# Patient Record
Sex: MALE | Race: WHITE | NOT HISPANIC OR LATINO | Employment: FULL TIME | URBAN - METROPOLITAN AREA
[De-identification: names, ages, dates, MRNs, and addresses within clinical notes are randomized per-mention and may not be internally consistent; named-entity substitution may affect disease eponyms.]

---

## 2023-10-12 ENCOUNTER — TELEPHONE (OUTPATIENT)
Dept: GASTROENTEROLOGY | Facility: CLINIC | Age: 53
End: 2023-10-12
Payer: COMMERCIAL

## 2023-10-12 NOTE — TELEPHONE ENCOUNTER
Mr. Dueñas called to schedule his colonoscopy. He would like to have an egd as well. He saw his pcp for chest pain in March and says it continues. Please advise.

## 2023-10-15 PROBLEM — R00.2 PALPITATIONS: Status: ACTIVE | Noted: 2023-10-15

## 2023-10-15 PROBLEM — E55.9 VITAMIN D DEFICIENCY: Status: ACTIVE | Noted: 2023-10-15

## 2023-10-15 PROBLEM — M89.8X9 ABNORMAL BONE FORMATION: Status: ACTIVE | Noted: 2023-10-15

## 2023-10-15 PROBLEM — M89.8X1 PAIN OF RIGHT SCAPULA: Status: ACTIVE | Noted: 2023-10-15

## 2023-10-15 PROBLEM — H57.89 EYE IRRITATION: Status: ACTIVE | Noted: 2023-10-15

## 2023-10-15 RX ORDER — FINASTERIDE 1 MG/1
1 TABLET, FILM COATED ORAL DAILY
COMMUNITY
End: 2023-10-17 | Stop reason: ALTCHOICE

## 2023-10-15 RX ORDER — ESOMEPRAZOLE MAGNESIUM 40 MG/1
20 CAPSULE, DELAYED RELEASE ORAL DAILY
COMMUNITY
End: 2023-12-13 | Stop reason: ALTCHOICE

## 2023-10-15 RX ORDER — FLUTICASONE PROPIONATE 50 MCG
1 SPRAY, SUSPENSION (ML) NASAL 2 TIMES DAILY PRN
COMMUNITY
End: 2023-12-20 | Stop reason: ALTCHOICE

## 2023-10-15 RX ORDER — HYDROCORTISONE 25 MG/G
1 CREAM TOPICAL AS NEEDED
COMMUNITY
Start: 2023-04-02

## 2023-10-16 ASSESSMENT — PROMIS GLOBAL HEALTH SCALE
RATE_PHYSICAL_HEALTH: VERY GOOD
RATE_GENERAL_HEALTH: VERY GOOD
RATE_SOCIAL_SATISFACTION: VERY GOOD
CARRYOUT_SOCIAL_ACTIVITIES: VERY GOOD
RATE_AVERAGE_PAIN: 5
EMOTIONAL_PROBLEMS: RARELY
RATE_MENTAL_HEALTH: VERY GOOD
CARRYOUT_PHYSICAL_ACTIVITIES: COMPLETELY
RATE_QUALITY_OF_LIFE: VERY GOOD

## 2023-10-17 ENCOUNTER — OFFICE VISIT (OUTPATIENT)
Dept: PRIMARY CARE | Facility: CLINIC | Age: 53
End: 2023-10-17
Payer: COMMERCIAL

## 2023-10-17 ENCOUNTER — LAB (OUTPATIENT)
Dept: LAB | Facility: LAB | Age: 53
End: 2023-10-17
Payer: COMMERCIAL

## 2023-10-17 VITALS
HEIGHT: 68 IN | WEIGHT: 154.76 LBS | RESPIRATION RATE: 16 BRPM | SYSTOLIC BLOOD PRESSURE: 122 MMHG | BODY MASS INDEX: 23.46 KG/M2 | DIASTOLIC BLOOD PRESSURE: 70 MMHG | OXYGEN SATURATION: 98 %

## 2023-10-17 DIAGNOSIS — R07.9 CHEST PAIN, UNSPECIFIED TYPE: ICD-10-CM

## 2023-10-17 DIAGNOSIS — L64.9 MALE PATTERN BALDNESS: ICD-10-CM

## 2023-10-17 DIAGNOSIS — Z00.00 ENCOUNTER FOR PREVENTIVE HEALTH EXAMINATION: ICD-10-CM

## 2023-10-17 DIAGNOSIS — Z00.00 ENCOUNTER FOR PREVENTIVE HEALTH EXAMINATION: Primary | ICD-10-CM

## 2023-10-17 LAB
ALBUMIN SERPL BCP-MCNC: 4.5 G/DL (ref 3.4–5)
ALP SERPL-CCNC: 65 U/L (ref 33–120)
ALT SERPL W P-5'-P-CCNC: 25 U/L (ref 10–52)
ANION GAP SERPL CALC-SCNC: 14 MMOL/L (ref 10–20)
AST SERPL W P-5'-P-CCNC: 22 U/L (ref 9–39)
BILIRUB SERPL-MCNC: 0.9 MG/DL (ref 0–1.2)
BUN SERPL-MCNC: 15 MG/DL (ref 6–23)
CALCIUM SERPL-MCNC: 9.6 MG/DL (ref 8.6–10.6)
CHLORIDE SERPL-SCNC: 102 MMOL/L (ref 98–107)
CHOLEST SERPL-MCNC: 164 MG/DL (ref 0–199)
CHOLESTEROL/HDL RATIO: 2.8
CO2 SERPL-SCNC: 29 MMOL/L (ref 21–32)
CREAT SERPL-MCNC: 0.81 MG/DL (ref 0.5–1.3)
ERYTHROCYTE [DISTWIDTH] IN BLOOD BY AUTOMATED COUNT: 12.2 % (ref 11.5–14.5)
EST. AVERAGE GLUCOSE BLD GHB EST-MCNC: 108 MG/DL
GFR SERPL CREATININE-BSD FRML MDRD: >90 ML/MIN/1.73M*2
GLUCOSE SERPL-MCNC: 85 MG/DL (ref 74–99)
HBA1C MFR BLD: 5.4 %
HCT VFR BLD AUTO: 47.5 % (ref 41–52)
HDLC SERPL-MCNC: 59.4 MG/DL
HGB BLD-MCNC: 15.3 G/DL (ref 13.5–17.5)
LDLC SERPL CALC-MCNC: 91 MG/DL
MCH RBC QN AUTO: 27.5 PG (ref 26–34)
MCHC RBC AUTO-ENTMCNC: 32.2 G/DL (ref 32–36)
MCV RBC AUTO: 85 FL (ref 80–100)
NON HDL CHOLESTEROL: 105 MG/DL (ref 0–149)
NRBC BLD-RTO: 0 /100 WBCS (ref 0–0)
PLATELET # BLD AUTO: 186 X10*3/UL (ref 150–450)
PMV BLD AUTO: 10.4 FL (ref 7.5–11.5)
POTASSIUM SERPL-SCNC: 4.6 MMOL/L (ref 3.5–5.3)
PROT SERPL-MCNC: 6.7 G/DL (ref 6.4–8.2)
PSA SERPL-MCNC: 0.48 NG/ML
RBC # BLD AUTO: 5.56 X10*6/UL (ref 4.5–5.9)
SODIUM SERPL-SCNC: 140 MMOL/L (ref 136–145)
TRIGL SERPL-MCNC: 67 MG/DL (ref 0–149)
VLDL: 13 MG/DL (ref 0–40)
WBC # BLD AUTO: 5.2 X10*3/UL (ref 4.4–11.3)

## 2023-10-17 PROCEDURE — 85027 COMPLETE CBC AUTOMATED: CPT

## 2023-10-17 PROCEDURE — 80061 LIPID PANEL: CPT

## 2023-10-17 PROCEDURE — 84153 ASSAY OF PSA TOTAL: CPT

## 2023-10-17 PROCEDURE — 36415 COLL VENOUS BLD VENIPUNCTURE: CPT

## 2023-10-17 PROCEDURE — 83036 HEMOGLOBIN GLYCOSYLATED A1C: CPT

## 2023-10-17 PROCEDURE — 99213 OFFICE O/P EST LOW 20 MIN: CPT | Performed by: INTERNAL MEDICINE

## 2023-10-17 PROCEDURE — 80053 COMPREHEN METABOLIC PANEL: CPT

## 2023-10-17 PROCEDURE — 99396 PREV VISIT EST AGE 40-64: CPT | Performed by: INTERNAL MEDICINE

## 2023-10-17 RX ORDER — FINASTERIDE 5 MG/1
5 TABLET, FILM COATED ORAL DAILY
Qty: 30 TABLET | Refills: 11 | Status: SHIPPED | OUTPATIENT
Start: 2023-10-17 | End: 2023-11-10 | Stop reason: SDUPTHER

## 2023-10-17 ASSESSMENT — ENCOUNTER SYMPTOMS
DEPRESSION: 0
LOSS OF SENSATION IN FEET: 0
OCCASIONAL FEELINGS OF UNSTEADINESS: 0

## 2023-10-17 NOTE — PROGRESS NOTES
"Subjective   Patient ID: Jeffery Dueñas is a 52 y.o. male who presents for Annual Exam.    HPI   Jeffery is a pleasant 52-year-old male with history of acid reflux here for physical.  Overall doing well.  Continues to have chest pain, it is burning, midsternal, comes and goes, he thinks is his acid reflux.  It is not associated with activity.  Denies any nausea vomiting, has occasional abdominal discomfort.  Appetite intact.  At times he feels like he may get short of breath with this chest pains but he is not really sure.  Does not happen every time.  He exercises regularly without any chest pain shortness of breath.  No palpitations no dizziness lightheadedness.  No diaphoresis.  No cough.  No wheezing.  Otherwise he has no complaints.  Continues to work.  Exercises.  Tries eat a healthy diet.    Review of Systems:  No unintentional weight gain or weight loss, no fevers no chills, no night sweats.  No fatigue.    No congestion runny nose sore throat.  No ear pain.  No tinnitus.  No change in hearing.  No change in vision.    No neck pain.    No cough no shortness of breath.  No wheezing.    Besides the episodes explained in HPI, no chest pain shortness of breath palpitations with rest or with activity.  No orthopnea no PND.  No edema.    No heat or cold intolerance, constipation diarrhea, weight changes.  No polyuria polydipsia.      No joint pain besides occasional aches and pains,  No muscle weakness.    No new rash.    No headache, no change in memory, no change in cognition.  No weakness numbness tingling of extremities.  No difficulty with gait.    No easy bruisability.    No feeling of sadness, loss of interest, anxiety.    No difficulty urinating, no dribbling, no hesitancy.  No testicular pain.  Objective   /70 (BP Location: Left arm)   Resp 16   Ht 1.721 m (5' 7.75\")   Wt 70.2 kg (154 lb 12.2 oz)   SpO2 98%   BMI 23.71 kg/m²     Physical Exam  Calm coherent well-appearing.    Neck is supple with " no tenderness, thyroid mobile soft with no nodules, oropharynx clear.  Sinuses nontender.    Breathing comfortably, clear to auscultation bilaterally.    Regular rate and rhythm, no murmur gallops or rubs, good distal pulses.  No edema.  No JVD.  No carotid bruit.    Abdomen is soft, nontender, normal bowel sounds.  No ascites.  No hepatosplenomegaly.    Upper and lower extremity joints intact with full active range of motion.  Strength is 5 out of 5 in upper and lower extremities.    Skin is grossly normal, moist.     Extremity warm, well-perfused, no clubbing or cyanosis.    Coherent, cognition intact, memory intact.  Gait intact.    No cervical, supraclavicular, axillary or inguinal lymphadenopathy.              Assessment/Plan     Jeffery is a pleasant 52-year-old male here for physical.    Chest pains: These are most likely due to his acid reflux, he is going to call Dr. aMnn and make an appointment for evaluation of endoscopy, he is already on PPI and taking it correctly.  However I do think we need to get work-up for ruling out cardiac origin given occasional shortness of breath although he really has no symptoms with exercising.  Certainly no concern for unstable angina.  We will start with CT calcium score and echo stress test.  Call with any new symptoms.    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    Health maintenance: He is due for colonoscopy, he is going to call Dr. Mann make this appointment.  Check PSA.  Clinically asymptomatic.  Routine labs today.  Has a healthy lifestyle.    Follow-up in 1 year.

## 2023-10-20 ENCOUNTER — TELEPHONE (OUTPATIENT)
Dept: PRIMARY CARE | Facility: CLINIC | Age: 53
End: 2023-10-20
Payer: COMMERCIAL

## 2023-11-09 ENCOUNTER — HOSPITAL ENCOUNTER (OUTPATIENT)
Dept: CARDIOLOGY | Facility: HOSPITAL | Age: 53
Discharge: HOME | End: 2023-11-09
Payer: COMMERCIAL

## 2023-11-09 DIAGNOSIS — R07.9 CHEST PAIN, UNSPECIFIED TYPE: ICD-10-CM

## 2023-11-09 PROCEDURE — 93016 CV STRESS TEST SUPVJ ONLY: CPT | Performed by: INTERNAL MEDICINE

## 2023-11-09 PROCEDURE — 93018 CV STRESS TEST I&R ONLY: CPT | Performed by: INTERNAL MEDICINE

## 2023-11-09 PROCEDURE — 93325 DOPPLER ECHO COLOR FLOW MAPG: CPT

## 2023-11-09 PROCEDURE — 93350 STRESS TTE ONLY: CPT | Performed by: INTERNAL MEDICINE

## 2023-11-09 PROCEDURE — 93350 STRESS TTE ONLY: CPT

## 2023-11-09 PROCEDURE — 93325 DOPPLER ECHO COLOR FLOW MAPG: CPT | Performed by: INTERNAL MEDICINE

## 2023-11-09 PROCEDURE — 2500000004 HC RX 250 GENERAL PHARMACY W/ HCPCS (ALT 636 FOR OP/ED): Performed by: INTERNAL MEDICINE

## 2023-11-09 RX ADMIN — PERFLUTREN 5 ML OF DILUTION: 6.52 INJECTION, SUSPENSION INTRAVENOUS at 13:09

## 2023-11-10 DIAGNOSIS — L64.9 MALE PATTERN BALDNESS: ICD-10-CM

## 2023-11-10 RX ORDER — FINASTERIDE 5 MG/1
5 TABLET, FILM COATED ORAL DAILY
Qty: 90 TABLET | Refills: 2 | Status: SHIPPED | OUTPATIENT
Start: 2023-11-10 | End: 2023-12-13 | Stop reason: ALTCHOICE

## 2023-11-13 DIAGNOSIS — Z12.11 COLON CANCER SCREENING: ICD-10-CM

## 2023-11-13 RX ORDER — POLYETHYLENE GLYCOL 3350, SODIUM SULFATE ANHYDROUS, SODIUM BICARBONATE, SODIUM CHLORIDE, POTASSIUM CHLORIDE 236; 22.74; 6.74; 5.86; 2.97 G/4L; G/4L; G/4L; G/4L; G/4L
4000 POWDER, FOR SOLUTION ORAL ONCE
Qty: 4000 ML | Refills: 0 | Status: SHIPPED | OUTPATIENT
Start: 2023-11-13 | End: 2023-11-13

## 2023-11-14 DIAGNOSIS — Z86.010 PERSONAL HISTORY OF COLONIC POLYPS: Primary | ICD-10-CM

## 2023-11-14 RX ORDER — SOD SULF/POT CHLORIDE/MAG SULF 1.479 G
TABLET ORAL
Qty: 24 TABLET | Refills: 0 | Status: SHIPPED | OUTPATIENT
Start: 2023-11-14 | End: 2023-12-13 | Stop reason: ALTCHOICE

## 2023-11-22 ENCOUNTER — OFFICE VISIT (OUTPATIENT)
Dept: GASTROENTEROLOGY | Facility: EXTERNAL LOCATION | Age: 53
End: 2023-11-22
Payer: COMMERCIAL

## 2023-11-22 DIAGNOSIS — Z12.11 SPECIAL SCREENING FOR MALIGNANT NEOPLASMS, COLON: ICD-10-CM

## 2023-11-22 DIAGNOSIS — D12.2 BENIGN NEOPLASM OF ASCENDING COLON: ICD-10-CM

## 2023-11-22 DIAGNOSIS — K21.9 GASTRO-ESOPHAGEAL REFLUX DISEASE WITHOUT ESOPHAGITIS: ICD-10-CM

## 2023-11-22 DIAGNOSIS — K22.70 BARRETT'S ESOPHAGUS WITHOUT DYSPLASIA: Primary | ICD-10-CM

## 2023-11-22 DIAGNOSIS — Z86.010 PERSONAL HISTORY OF COLONIC POLYPS: ICD-10-CM

## 2023-11-22 DIAGNOSIS — D12.0 BENIGN NEOPLASM OF CECUM: ICD-10-CM

## 2023-11-22 PROCEDURE — 88305 TISSUE EXAM BY PATHOLOGIST: CPT | Performed by: PATHOLOGY

## 2023-11-22 PROCEDURE — 88305 TISSUE EXAM BY PATHOLOGIST: CPT

## 2023-11-22 PROCEDURE — 43239 EGD BIOPSY SINGLE/MULTIPLE: CPT | Performed by: INTERNAL MEDICINE

## 2023-11-22 PROCEDURE — 45385 COLONOSCOPY W/LESION REMOVAL: CPT | Performed by: INTERNAL MEDICINE

## 2023-11-24 ENCOUNTER — LAB REQUISITION (OUTPATIENT)
Dept: LAB | Facility: HOSPITAL | Age: 53
End: 2023-11-24
Payer: COMMERCIAL

## 2023-11-25 ENCOUNTER — ANCILLARY PROCEDURE (OUTPATIENT)
Dept: RADIOLOGY | Facility: CLINIC | Age: 53
End: 2023-11-25
Payer: COMMERCIAL

## 2023-11-25 DIAGNOSIS — R07.9 CHEST PAIN, UNSPECIFIED TYPE: ICD-10-CM

## 2023-11-25 PROCEDURE — 75571 CT HRT W/O DYE W/CA TEST: CPT

## 2023-11-27 DIAGNOSIS — R91.1 LUNG NODULE: Primary | ICD-10-CM

## 2023-11-27 DIAGNOSIS — Z01.818 PRE-OP TESTING: ICD-10-CM

## 2023-11-27 NOTE — PROGRESS NOTES
Bronchoscopy Scheduling Request    Pre-bronchoscopy visit: New patient visit with Bronchoscopy group provider    Cytology on-site:  Yes  Location:  [facility]  Performing physician:  Advanced diagnostic bronchoscopist  Referring physician:  Ventura Leigh MD  Indication:  RUL nodule  Sedation / Anesthesia:  GA  Procedure:  Navigational bronchoscopy, Staging EBUS  Time:  Tier 3  Fluorscopy:   Yes  Imaging needed:  CT Valentin (ordered by PCP)  Labs:  CBC, BMP  Meds:  None  Special Considerations:  None  Reviewed by:  Mg Kay MD

## 2023-11-29 ENCOUNTER — TELEMEDICINE (OUTPATIENT)
Dept: PULMONOLOGY | Facility: CLINIC | Age: 53
End: 2023-11-29
Payer: COMMERCIAL

## 2023-11-29 ENCOUNTER — LAB (OUTPATIENT)
Dept: LAB | Facility: LAB | Age: 53
End: 2023-11-29
Payer: COMMERCIAL

## 2023-11-29 DIAGNOSIS — R91.1 LUNG NODULE: ICD-10-CM

## 2023-11-29 DIAGNOSIS — Z01.818 PRE-OP TESTING: ICD-10-CM

## 2023-11-29 LAB
ALBUMIN SERPL BCP-MCNC: 4.8 G/DL (ref 3.4–5)
ALP SERPL-CCNC: 64 U/L (ref 33–120)
ALT SERPL W P-5'-P-CCNC: 24 U/L (ref 10–52)
ANION GAP SERPL CALC-SCNC: 12 MMOL/L (ref 10–20)
AST SERPL W P-5'-P-CCNC: 16 U/L (ref 9–39)
BASOPHILS # BLD AUTO: 0.02 X10*3/UL (ref 0–0.1)
BASOPHILS NFR BLD AUTO: 0.3 %
BILIRUB SERPL-MCNC: 2 MG/DL (ref 0–1.2)
BUN SERPL-MCNC: 18 MG/DL (ref 6–23)
CALCIUM SERPL-MCNC: 10.1 MG/DL (ref 8.6–10.6)
CHLORIDE SERPL-SCNC: 102 MMOL/L (ref 98–107)
CO2 SERPL-SCNC: 29 MMOL/L (ref 21–32)
CREAT SERPL-MCNC: 0.89 MG/DL (ref 0.5–1.3)
EOSINOPHIL # BLD AUTO: 0.05 X10*3/UL (ref 0–0.7)
EOSINOPHIL NFR BLD AUTO: 0.7 %
ERYTHROCYTE [DISTWIDTH] IN BLOOD BY AUTOMATED COUNT: 12.1 % (ref 11.5–14.5)
GFR SERPL CREATININE-BSD FRML MDRD: >90 ML/MIN/1.73M*2
GLUCOSE SERPL-MCNC: 142 MG/DL (ref 74–99)
HCT VFR BLD AUTO: 48.7 % (ref 41–52)
HGB BLD-MCNC: 16.2 G/DL (ref 13.5–17.5)
IMM GRANULOCYTES # BLD AUTO: 0.02 X10*3/UL (ref 0–0.7)
IMM GRANULOCYTES NFR BLD AUTO: 0.3 % (ref 0–0.9)
LYMPHOCYTES # BLD AUTO: 2.19 X10*3/UL (ref 1.2–4.8)
LYMPHOCYTES NFR BLD AUTO: 32.3 %
MCH RBC QN AUTO: 27.6 PG (ref 26–34)
MCHC RBC AUTO-ENTMCNC: 33.3 G/DL (ref 32–36)
MCV RBC AUTO: 83 FL (ref 80–100)
MONOCYTES # BLD AUTO: 0.47 X10*3/UL (ref 0.1–1)
MONOCYTES NFR BLD AUTO: 6.9 %
NEUTROPHILS # BLD AUTO: 4.04 X10*3/UL (ref 1.2–7.7)
NEUTROPHILS NFR BLD AUTO: 59.5 %
NRBC BLD-RTO: 0 /100 WBCS (ref 0–0)
PLATELET # BLD AUTO: 234 X10*3/UL (ref 150–450)
POTASSIUM SERPL-SCNC: 4.3 MMOL/L (ref 3.5–5.3)
PROT SERPL-MCNC: 6.9 G/DL (ref 6.4–8.2)
RBC # BLD AUTO: 5.86 X10*6/UL (ref 4.5–5.9)
SODIUM SERPL-SCNC: 139 MMOL/L (ref 136–145)
WBC # BLD AUTO: 6.8 X10*3/UL (ref 4.4–11.3)

## 2023-11-29 PROCEDURE — 36415 COLL VENOUS BLD VENIPUNCTURE: CPT

## 2023-11-29 PROCEDURE — 80053 COMPREHEN METABOLIC PANEL: CPT

## 2023-11-29 PROCEDURE — 99202 OFFICE O/P NEW SF 15 MIN: CPT | Performed by: NURSE PRACTITIONER

## 2023-11-29 PROCEDURE — 85025 COMPLETE CBC W/AUTO DIFF WBC: CPT

## 2023-11-29 ASSESSMENT — ENCOUNTER SYMPTOMS
BACK PAIN: 1
NERVOUS/ANXIOUS: 1

## 2023-11-29 NOTE — PROGRESS NOTES
" Patient: Jeffery Dueñas    [id number]  : 1970 -- AGE 53 y.o.    Provider: EDITH Matias     Location [facility]   Service Date: 2023              [facility]  New Visit Note      HISTORY OF PRESENT ILLNESS     The patient's referring provider is:  PCP Dr Leigh     HISTORY OF PRESENT ILLNESS   Jeffery Dueñas \"Calvin\" is a 53 y.o. male who presents to a [facility] for an evaluation with concerns of Lung Nodule. I have independently interviewed and examined the patient in the office and reviewed available records.    Current History  Patient reported c/o acid reflux  symptoms to PCP and was referred to CT cardio score and stress test. Stress test as wnl. CT score revealed right lung adenopathy.   At baseline, he does not have dyspnea on exertion or none at rest. He reports taking a deep breathe can be difficult.  He currently sits for most of the day, works inside the house, but does not carry loads and do strenuous exercise. He is active in her everyday life and carries loads and does strenuous exercise.  He also denies orthopnea, pnd, or han.  His weight has been mostly stable.  He also denies chronic cough, wheezing, and clear, green, blood streaks, but no sputum. No night cough. No hemoptysis. No fever or shivering chills. He has a runny nose, and a tingling sensation in the back of his throat.  Also complains of heartburn- on PPI. He has midsternal chest pain.       Previous pulmonary history: He has no history of recurrent infections, or lung disease as a child.  He had no previous lung hx, never on oxygen or inhaler therapy.   Had PNA as a child    Inhalers/nebulized medications: none    Hospitalization History: He has not been hospitalized over the last year for breathing related problem.    Sleep history:  Denies snoring, apnea, feeling tired during the day or taking naps during the day.     ALLERGIES " AND MEDICATIONS     ALLERGIES  No Known Allergies    MEDICATIONS  Current Outpatient Medications   Medication Sig Dispense Refill    esomeprazole (NexIUM) 40 mg DR capsule Take 1 capsule (40 mg) by mouth once daily.      finasteride (Proscar) 5 mg tablet Take 1 tablet (5 mg) by mouth once daily. Do not crush, chew, or split. 90 tablet 2    fluticasone (Flonase) 50 mcg/actuation nasal spray Administer 1 spray into each nostril 2 times a day as needed.      hydrocortisone 2.5 % cream Apply 1 Application topically if needed.  APPLY 2-3 TIMES A DAY TO AFFECTED AREA(S)      sod sulf-pot chloride-mag sulf (Sutab) 1.479-0.188- 0.225 gram tablet Starting at 6pm open one bottle of pills and fill glass provided with water and drink according to prep sheet. Start the 2nd bottle with directions on prep sheet 5 hours before procedure time (Patient not taking: Reported on 11/29/2023) 24 tablet 0     No current facility-administered medications for this visit.         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, Other chest pain (08/31/2018), Other conditions influencing health status (06/30/2017), Other synovitis and tenosynovitis, left forearm (05/09/2017), Overexertion from repetitive movements, initial encounter (01/10/2017), Pain in right hip (11/19/2019), Pain in unspecified wrist (01/05/2017), Personal history of other diseases of the digestive system (12/14/2018), Personal history of other diseases of the musculoskeletal system and connective tissue (07/21/2016), Personal history of other diseases of the musculoskeletal system and connective tissue (07/23/2016), Personal history of other diseases of the respiratory system (11/22/2019), Personal history of other diseases of the  respiratory system (03/13/2017), Personal history of other diseases of the respiratory system, Personal history of other diseases of the respiratory system (10/28/2013), Personal history of other specified conditions (12/27/2018), Personal history of other specified conditions, Personal history of other specified conditions (10/12/2018), Personal history of other specified conditions, Unspecified sprain of left wrist, initial encounter (05/09/2017), and Unspecified sprain of unspecified shoulder joint, initial encounter (06/27/2013).    PAST SURGICAL HISTORY  No past surgical history on file.    IMMUNIZATION HISTORY  Immunization History   Administered Date(s) Administered    Influenza, Unspecified 12/02/2009, 10/09/2012, 09/25/2013, 10/23/2019    Influenza, seasonal, injectable 12/16/2021    Pfizer COVID-19 vaccine, bivalent, age 12 years and older (30 mcg/0.3 mL) 11/16/2022    Pfizer Purple Cap SARS-CoV-2 03/06/2021, 03/29/2021, 10/10/2021    Zoster, Unspecified 12/16/2021, 06/07/2022       SOCIAL HISTORY  He  has no history on file for tobacco use, alcohol use, and drug use. 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  DOES/DOES NOT EC: does not have exposure to birds or exotic animals.    FAMILY HISTORY  Family History   Problem Relation Name Age of Onset    Psoriasis Father      Idiopathic pulmonary fibrosis Father      Other (stroke syndrome) Father      Colon cancer Brother       DOES/DOES NOT EC: does have a family history of pulmonary disease.  DOES/DOES NOT EC: does have a family history of cancer.  DOES/DOES NOT EC: does have a family history of autoimmune disorders.    RESULTS/DATA     Pulmonary Function Test Results           Chest Radiograph     No results found for this or any  previous visit from the past 2000 days.      Chest CT Scan        CT chest wo IV contrast for BRIGID bronc planning  Status: Final result     PACS Images     Show images for CT chest wo IV contrast for BRIGID bronc planning  Signed by    Signed Time Phone Pager   Santos Cai MD 11/28/2023 12:21 [phone]     Exam Information    Status Exam Begun Exam Ended   Final 11/28/2023 10:25 11/28/2023 10:37     Study Result    Narrative & Impression   Interpreted By:  Santos Cai,   STUDY:  CT CHEST WITHOUT FOR BRIGID BRONC PLANNING;  11/28/2023 10:37 am      INDICATION:  Signs/Symptoms:pulmonary nodule.      COMPARISON:  November 25, 2020 CT calcium score examination.      ACCESSION NUMBER(S):  [id number]      ORDERING CLINICIAN:  RADHA CAMPOS      TECHNIQUE:  Noncontrast CT images of the chest were performed with coronal and  sagittal reformatted images.      FINDINGS:  SUPPORT DEVICES:   None.      CHEST WALL AND LOWER NECK:   No supraclavicular or axillary  lymphadenopathy by CT size criteria.      MEDIASTINUM AND TITI:   Redemonstrated prominence of  right  paratracheal nodes measuring up to 12 mm short axis series 21, image  157.      VESSELS:   The thoracic aorta and main pulmonary artery are normal in  caliber.      HEART:   The heart size is normal.  No pericardial effusion.      LUNG, PLEURA, LARGE AIRWAYS:   Unchanged right upper lobe nodule with  air bronchogram measuring 16 x 16 mm series 204, image 157.      Mild scattered smaller nodules with reference nodules including:  Anterior right upper lobe 2 mm nodule image 174. 3 mm nodule right  base image 315, and adjacent left lower lobe nodules left lower lobe  largest measuring 5 mm image 273.      UPPER ABDOMEN:   The liver demonstrates normal morphology with mild  scattered subcentimeter homogeneous hypodensities which are too small  to characterize although most suggestive of cysts including 6 mm  hypodensity series 202, image 67. There is a  "nodular bandlike  hypodensity segment 5 measuring 8 mm transverse diameter. The finding  could be due to artifact given bandlike shaped versus nonspecific  underlying liver lesion. Attention recommended on follow-up  assessment.      BONES:   No aggressive osseous lesions.      IMPRESSION:  Unchanged suspicious nonspecific right upper lobe nodule and right  paratracheal lymphadenopathy.      Mild additional scattered tiny nonspecific pulmonary nodules.  Attention recommended on follow-up assessment.      Scattered subcentimeter liver hypodensities the majority of which are  most suggestive of cysts. Dominant hypodensity is indeterminate  potentially artifact versus nonspecific      Incidental Finding:  There is a hypodense lesion measuring less than  10 mm within the liver.  (**-YCF-**)      Instructions: No known malignancy, hepatic dysfunction/risk factors:  favor benign etiology, no further imaging recommended. High-risk  patients: consider outpatient liver MRI in 3-6 months (or earlier if  needed). Rio RM, Rodrick PJ, Joshua KJ, et al. (Management of  Incidental Liver Lesions on CT: A White Paper of the ACR Incidental  Findings Committee. J Am Calin Radiol. 2017;14(11):4477-8734.)  LIVER.ACR.IF.1          Signed by: Santos Cai 11/28/2023 12:21 PM  Dictation workstation:   [id number]     Result History    CT chest wo IV contrast for [facility] planning (Order #[id number]) on 11/28/2023 - Order Result History Report    Breast Imaging Recommendations  Jeffery Dueñas \"Calvin\"  No recommendations exist for this order.  Imaging Findings    Finding Acuity Linked Recommendation Recommendation Status Finding Status   There is a hypodense lesion measuring less than 10mm within the liver Yellow Alert   Reviewed     Signed by    Signed Time Phone Pager   Santos Cai MD 11/28/2023 12:21 [phone]     Exam Information    Status Exam Begun Exam Ended   Final 11/28/2023 10:25 11/28/2023 10:37     External " Results Report    Open External Results Report    Encounter    View Encounter             Screening Form Questions    No questions have been answered for this form.     CT chest wo IV contrast for BRIGID bronc planning  Order: [id number]  Status: Final result         Visible to patient: Yes (not seen)         Next appt: 11/30/2023 at 11:00 AM in Gastroenterology (Calvin Carver MD)         Dx: Lung nodule      0 Result Notes         1 Patient Communication    Findings     Acuity Comment   There is a hypodense lesion measuring less than 10mm within the liver Yellow Alert      Details    Reading Physician Reading Date Result Priority   Santos Cai MD  [phone]  [id number] 11/28/2023      Narrative & Impression  Interpreted By:  Santos Cai,   STUDY:  CT CHEST WITHOUT FOR BRIGID BRONC PLANNING;  11/28/2023 10:37 am      INDICATION:  Signs/Symptoms:pulmonary nodule.      COMPARISON:  November 25, 2020 CT calcium score examination.      ACCESSION NUMBER(S):  [id number]      ORDERING CLINICIAN:  RADHA CAMPOS      TECHNIQUE:  Noncontrast CT images of the chest were performed with coronal and  sagittal reformatted images.      FINDINGS:  SUPPORT DEVICES:   None.      CHEST WALL AND LOWER NECK:   No supraclavicular or axillary  lymphadenopathy by CT size criteria.      MEDIASTINUM AND TITI:   Redemonstrated prominence of  right  paratracheal nodes measuring up to 12 mm short axis series 21, image  157.      VESSELS:   The thoracic aorta and main pulmonary artery are normal in  caliber.      HEART:   The heart size is normal.  No pericardial effusion.      LUNG, PLEURA, LARGE AIRWAYS:   Unchanged right upper lobe nodule with  air bronchogram measuring 16 x 16 mm series 204, image 157.      Mild scattered smaller nodules with reference nodules including:  Anterior right upper lobe 2 mm nodule image 174. 3 mm nodule right  base image 315, and adjacent left lower lobe nodules left lower lobe  largest measuring 5 mm  image 273.      UPPER ABDOMEN:   The liver demonstrates normal morphology with mild  scattered subcentimeter homogeneous hypodensities which are too small  to characterize although most suggestive of cysts including 6 mm  hypodensity series 202, image 67. There is a nodular bandlike  hypodensity segment 5 measuring 8 mm transverse diameter. The finding  could be due to artifact given bandlike shaped versus nonspecific  underlying liver lesion. Attention recommended on follow-up  assessment.      BONES:   No aggressive osseous lesions.      IMPRESSION:  Unchanged suspicious nonspecific right upper lobe nodule and right  paratracheal lymphadenopathy.      Mild additional scattered tiny nonspecific pulmonary nodules.  Attention recommended on follow-up assessment.      Scattered subcentimeter liver hypodensities the majority of which are  most suggestive of cysts. Dominant hypodensity is indeterminate  potentially artifact versus nonspecific      Incidental Finding:  There is a hypodense lesion measuring less than  10 mm within the liver.  (**-YCF-**)      Instructions: No known malignancy, hepatic dysfunction/risk factors:  favor benign etiology, no further imaging recommended. High-risk  patients: consider outpatient liver MRI in 3-6 months (or earlier if  needed). Rio RM, Rodrick PJ, Joshua KJ, et al. (Management of  Incidental Liver Lesions on CT: A White Paper of the ACR Incidental  Findings Committee. J Am Calin Radiol. 2017;14(11):7414-2553.)  [email or web address].1          Signed by: Santos Cai 11/28/2023 12:21 PM  Dictation workstation:   [id number]             Specimen Collected: 11/28/23 12:22 Last Resulted: 11/28/23 12:21       Order Details          View Encounter          Lab and Collection Details          Routing          Result History       View All Conversations on this Encounter           Result Care Coordination      Patient Communication     Edit Comments   Add Notifications  Back to Top       Discussed results with patient, we are setting him up with Dr. Mg Kay in pulmonary for biopsy.   Written by Ventura Campos MD on 11/28/2023  1:57 PM EST              Study Details    Open Study Details           Interpretation Summary    Interpreted By:  Santos Cai,   STUDY:  CT CHEST WITHOUT FOR BRIGID [facility] PLANNING;  11/28/2023 10:37 am      INDICATION:  Signs/Symptoms:pulmonary nodule.      COMPARISON:  November 25, 2020 CT calcium score examination.      ACCESSION NUMBER(S):  [id number]      ORDERING CLINICIAN:  VENTURA CAMPOS      TECHNIQUE:  Noncontrast CT images of the chest were performed with coronal and  sagittal reformatted images.      FINDINGS:  SUPPORT DEVICES:   None.      CHEST WALL AND LOWER NECK:   No supraclavicular or axillary  lymphadenopathy by CT size criteria.      MEDIASTINUM AND TITI:   Redemonstrated prominence of  right  paratracheal nodes measuring up to 12 mm short axis series 21, image  157.      VESSELS:   The thoracic aorta and main pulmonary artery are normal in  caliber.      HEART:   The heart size is normal.  No pericardial effusion.      LUNG, PLEURA, LARGE AIRWAYS:   Unchanged right upper lobe nodule with  air bronchogram measuring 16 x 16 mm series 204, image 157.      Mild scattered smaller nodules with reference nodules including:  Anterior right upper lobe 2 mm nodule image 174. 3 mm nodule right  base image 315, and adjacent left lower lobe nodules left lower lobe  largest measuring 5 mm image 273.      UPPER ABDOMEN:   The liver demonstrates normal morphology with mild  scattered subcentimeter homogeneous hypodensities which are too small  to characterize although most suggestive of cysts including 6 mm  hypodensity series 202, image 67. There is a nodular bandlike  hypodensity segment 5 measuring 8 mm transverse diameter. The finding  could be due to artifact given bandlike shaped versus nonspecific  underlying liver lesion. Attention recommended on  follow-up  assessment.      BONES:   No aggressive osseous lesions.      IMPRESSION:  Unchanged suspicious nonspecific right upper lobe nodule and right  paratracheal lymphadenopathy.      Mild additional scattered tiny nonspecific pulmonary nodules.  Attention recommended on follow-up assessment.      Scattered subcentimeter liver hypodensities the majority of which are  most suggestive of cysts. Dominant hypodensity is indeterminate  potentially artifact versus nonspecific      Incidental Finding:  There is a hypodense lesion measuring less than  10 mm within the liver.  (**-YCF-**)      Instructions: No known malignancy, hepatic dysfunction/risk factors:  favor benign etiology, no further imaging recommended. High-risk  patients: consider outpatient liver MRI in 3-6 months (or earlier if  needed). Rio RM, Noamt PJ, Joshua KJ, et al. (Management of  Incidental Liver Lesions on CT: A White Paper of the ACR Incidental  Findings Committee. J Am Calin Radiol. 2017;14(11):3880-2274.)  LIVER.[email or web address].1          Signed by: Santos Cai 11/28/2023 12:21 PM  Dictation workstation:   [id number]            PACS Images     Show images for CT chest wo IV contrast for [facility] planning          Echocardiogram     No results found for this or any previous visit from the past 365 days.         REVIEW OF SYSTEMS     REVIEW OF SYSTEMS  Review of Systems   Cardiovascular:  Positive for chest pain.   Musculoskeletal:  Positive for back pain.   Allergic/Immunologic: Positive for environmental allergies.   Psychiatric/Behavioral:  The patient is nervous/anxious.    All other systems reviewed and are negative.        PHYSICAL EXAM     VITAL SIGNS: There were no vitals taken for this visit.     CURRENT WEIGHT: [unfilled]  BMI: [unfilled]  PREVIOUS WEIGHTS:  Wt Readings from Last 3 Encounters:   10/17/23 70.2 kg (154 lb 12.2 oz)   03/20/23 69.1 kg (152 lb 4 oz)   11/16/22 69.9 kg (154 lb 2 oz)       Physical Exam    Constitutional:  Alert and oriented.  No acute distress. Well developed, well nourished.  Pulm: patent airways  Psychiatric: Intact judgement and insight.    ASSESSMENT/PLAN     Mr. Dueñas is a 53 y.o. 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, Other chest pain (08/31/2018), Other conditions influencing health status (06/30/2017), Other synovitis and tenosynovitis, left forearm (05/09/2017), Overexertion from repetitive movements, initial encounter (01/10/2017), Pain in right hip (11/19/2019), Pain in unspecified wrist (01/05/2017), Personal history of other diseases of the digestive system (12/14/2018), Personal history of other diseases of the musculoskeletal system and connective tissue (07/21/2016), Personal history of other diseases of the musculoskeletal system and connective tissue (07/23/2016), Personal history of other diseases of the respiratory system (11/22/2019), Personal history of other diseases of the respiratory system (03/13/2017), Personal history of other diseases of the respiratory system, Personal history of other diseases of the respiratory system (10/28/2013), Personal history of other specified conditions (12/27/2018), Personal history of other specified conditions, Personal history of other specified conditions (10/12/2018), Personal history of other specified conditions, Unspecified sprain of left wrist, initial encounter (05/09/2017), and Unspecified sprain of unspecified shoulder joint, initial encounter (06/27/2013). He was referred to the [facility] for evaluation of Right lung mass    Problem List and Orders      Assessment and Plan / Recommendations:  Problem List Items Addressed This Visit       Lung nodule    Relevant Orders    CBC and Auto Differential     Comprehensive Metabolic Panel     Other Visit Diagnoses       Pre-op testing        Relevant Orders    CBC and Auto Differential    Comprehensive Metabolic Panel              Patient's visit was converted to a virtual visit.    1. Suspicious nonspecific right upper lobe nodule and right  paratracheal lymphadenopathy.  - Plan for bronchoscopy with biopsy   - Lab work prior to procedure - drawn today  - Not on any anticoagulation     I explained the procedure to the patient. We discussed that the bronchoscopy will be performed by a member of the Interventional Pulmonary Team; depending on scheduling and provider availability. We also discussed that the IP providers function as a team and not infrequently may have to fill in for one another if there are emergent issues that need attention at the same time. The patient / family expressed understanding and agreed to proceed. All questions were answered.     Patient's visit was converted to a virtual visit. Spoke with the patient via phone for 24 minutes.    Prep: 7 minutes  Phone: 24 minutes  Total: 31 minutes              Thank you for visiting the Pulmonary clinic today!   Your breathing medications:   Tests:   Referrals:   Education in the clinic:   Vaccines:   Records   Return in    Return to clinic after _____weeks and after PFTs, CT scan complete  or sooner if needed   Melissa Brennan CNP  My office number is [phone] -     Best way to get a hold of me is to call my office --> Please do not send me follow my health messages  Any test results will be discussed at next visit -- please make sure to make a follow up appt after testing.

## 2023-11-30 ENCOUNTER — HOSPITAL ENCOUNTER (OUTPATIENT)
Dept: RADIOLOGY | Facility: HOSPITAL | Age: 53
Setting detail: OUTPATIENT SURGERY
Discharge: HOME | End: 2023-11-30
Payer: COMMERCIAL

## 2023-11-30 ENCOUNTER — HOSPITAL ENCOUNTER (OUTPATIENT)
Dept: GASTROENTEROLOGY | Facility: HOSPITAL | Age: 53
Setting detail: OUTPATIENT SURGERY
Discharge: HOME | End: 2023-11-30
Payer: COMMERCIAL

## 2023-11-30 ENCOUNTER — TELEPHONE (OUTPATIENT)
Dept: PULMONOLOGY | Facility: HOSPITAL | Age: 53
End: 2023-11-30

## 2023-11-30 ENCOUNTER — ANESTHESIA (OUTPATIENT)
Dept: GASTROENTEROLOGY | Facility: HOSPITAL | Age: 53
End: 2023-11-30
Payer: COMMERCIAL

## 2023-11-30 ENCOUNTER — ANESTHESIA EVENT (OUTPATIENT)
Dept: GASTROENTEROLOGY | Facility: HOSPITAL | Age: 53
End: 2023-11-30
Payer: COMMERCIAL

## 2023-11-30 VITALS
SYSTOLIC BLOOD PRESSURE: 138 MMHG | HEART RATE: 79 BPM | BODY MASS INDEX: 22.43 KG/M2 | HEIGHT: 68 IN | WEIGHT: 148 LBS | DIASTOLIC BLOOD PRESSURE: 68 MMHG | TEMPERATURE: 97.9 F | OXYGEN SATURATION: 100 % | RESPIRATION RATE: 12 BRPM

## 2023-11-30 DIAGNOSIS — R91.1 LUNG NODULE: ICD-10-CM

## 2023-11-30 PROBLEM — M89.8X1 PAIN OF RIGHT SCAPULA: Status: RESOLVED | Noted: 2023-10-15 | Resolved: 2023-11-30

## 2023-11-30 PROCEDURE — 7100000001 HC RECOVERY ROOM TIME - INITIAL BASE CHARGE: Performed by: INTERNAL MEDICINE

## 2023-11-30 PROCEDURE — 88312 SPECIAL STAINS GROUP 1: CPT | Performed by: PATHOLOGY

## 2023-11-30 PROCEDURE — 31623 DX BRONCHOSCOPE/BRUSH: CPT | Performed by: INTERNAL MEDICINE

## 2023-11-30 PROCEDURE — 2500000004 HC RX 250 GENERAL PHARMACY W/ HCPCS (ALT 636 FOR OP/ED)

## 2023-11-30 PROCEDURE — 31629 BRONCHOSCOPY/NEEDLE BX EACH: CPT | Performed by: INTERNAL MEDICINE

## 2023-11-30 PROCEDURE — 31628 BRONCHOSCOPY/LUNG BX EACH: CPT | Performed by: INTERNAL MEDICINE

## 2023-11-30 PROCEDURE — 7100000010 HC PHASE TWO TIME - EACH INCREMENTAL 1 MINUTE: Performed by: INTERNAL MEDICINE

## 2023-11-30 PROCEDURE — 31632 BRONCHOSCOPY/LUNG BX ADDL: CPT | Performed by: INTERNAL MEDICINE

## 2023-11-30 PROCEDURE — 88342 IMHCHEM/IMCYTCHM 1ST ANTB: CPT | Mod: TC,SUR | Performed by: INTERNAL MEDICINE

## 2023-11-30 PROCEDURE — 31654 BRONCH EBUS IVNTJ PERPH LES: CPT | Performed by: INTERNAL MEDICINE

## 2023-11-30 PROCEDURE — A31652 PR BRNCHSC EBUS GUIDED SAMPL 1/2 NODE STATION/STRUX

## 2023-11-30 PROCEDURE — 2500000005 HC RX 250 GENERAL PHARMACY W/O HCPCS

## 2023-11-30 PROCEDURE — 88342 IMHCHEM/IMCYTCHM 1ST ANTB: CPT | Performed by: PATHOLOGY

## 2023-11-30 PROCEDURE — 88104 CYTOPATH FL NONGYN SMEARS: CPT | Mod: TC,MCY | Performed by: INTERNAL MEDICINE

## 2023-11-30 PROCEDURE — 88189 FLOWCYTOMETRY/READ 16 & >: CPT | Performed by: PATHOLOGY

## 2023-11-30 PROCEDURE — 3700000001 HC GENERAL ANESTHESIA TIME - INITIAL BASE CHARGE: Performed by: INTERNAL MEDICINE

## 2023-11-30 PROCEDURE — 88333 PATH CONSLTJ SURG CYTO XM 1: CPT | Performed by: PATHOLOGY

## 2023-11-30 PROCEDURE — 71045 X-RAY EXAM CHEST 1 VIEW: CPT | Mod: FY

## 2023-11-30 PROCEDURE — 88305 TISSUE EXAM BY PATHOLOGIST: CPT | Performed by: STUDENT IN AN ORGANIZED HEALTH CARE EDUCATION/TRAINING PROGRAM

## 2023-11-30 PROCEDURE — 88173 CYTOPATH EVAL FNA REPORT: CPT | Performed by: PATHOLOGY

## 2023-11-30 PROCEDURE — 3700000002 HC GENERAL ANESTHESIA TIME - EACH INCREMENTAL 1 MINUTE: Performed by: INTERNAL MEDICINE

## 2023-11-30 PROCEDURE — 88312 SPECIAL STAINS GROUP 1: CPT | Performed by: STUDENT IN AN ORGANIZED HEALTH CARE EDUCATION/TRAINING PROGRAM

## 2023-11-30 PROCEDURE — 88342 IMHCHEM/IMCYTCHM 1ST ANTB: CPT | Performed by: STUDENT IN AN ORGANIZED HEALTH CARE EDUCATION/TRAINING PROGRAM

## 2023-11-30 PROCEDURE — 31652 BRONCH EBUS SAMPLNG 1/2 NODE: CPT | Performed by: INTERNAL MEDICINE

## 2023-11-30 PROCEDURE — 88341 IMHCHEM/IMCYTCHM EA ADD ANTB: CPT | Performed by: STUDENT IN AN ORGANIZED HEALTH CARE EDUCATION/TRAINING PROGRAM

## 2023-11-30 PROCEDURE — 31627 NAVIGATIONAL BRONCHOSCOPY: CPT | Performed by: INTERNAL MEDICINE

## 2023-11-30 PROCEDURE — 88172 CYTP DX EVAL FNA 1ST EA SITE: CPT | Performed by: PATHOLOGY

## 2023-11-30 PROCEDURE — 31633 BRONCHOSCOPY/NEEDLE BX ADDL: CPT | Performed by: INTERNAL MEDICINE

## 2023-11-30 PROCEDURE — 87102 FUNGUS ISOLATION CULTURE: CPT | Performed by: INTERNAL MEDICINE

## 2023-11-30 PROCEDURE — A31652 PR BRNCHSC EBUS GUIDED SAMPL 1/2 NODE STATION/STRUX: Performed by: STUDENT IN AN ORGANIZED HEALTH CARE EDUCATION/TRAINING PROGRAM

## 2023-11-30 PROCEDURE — 88312 SPECIAL STAINS GROUP 1: CPT | Mod: TC,SUR | Performed by: INTERNAL MEDICINE

## 2023-11-30 PROCEDURE — 88305 TISSUE EXAM BY PATHOLOGIST: CPT | Performed by: PATHOLOGY

## 2023-11-30 PROCEDURE — 71045 X-RAY EXAM CHEST 1 VIEW: CPT | Performed by: RADIOLOGY

## 2023-11-30 PROCEDURE — 7100000009 HC PHASE TWO TIME - INITIAL BASE CHARGE: Performed by: INTERNAL MEDICINE

## 2023-11-30 PROCEDURE — 88341 IMHCHEM/IMCYTCHM EA ADD ANTB: CPT | Performed by: PATHOLOGY

## 2023-11-30 PROCEDURE — 7100000002 HC RECOVERY ROOM TIME - EACH INCREMENTAL 1 MINUTE: Performed by: INTERNAL MEDICINE

## 2023-11-30 PROCEDURE — 2720000007 HC OR 272 NO HCPCS: Performed by: INTERNAL MEDICINE

## 2023-11-30 RX ORDER — LIDOCAINE HYDROCHLORIDE 20 MG/ML
INJECTION, SOLUTION INFILTRATION; PERINEURAL AS NEEDED
Status: DISCONTINUED | OUTPATIENT
Start: 2023-11-30 | End: 2023-11-30

## 2023-11-30 RX ORDER — DEXAMETHASONE SODIUM PHOSPHATE 4 MG/ML
INJECTION, SOLUTION INTRA-ARTICULAR; INTRALESIONAL; INTRAMUSCULAR; INTRAVENOUS; SOFT TISSUE AS NEEDED
Status: DISCONTINUED | OUTPATIENT
Start: 2023-11-30 | End: 2023-11-30

## 2023-11-30 RX ORDER — SODIUM CHLORIDE, SODIUM LACTATE, POTASSIUM CHLORIDE, CALCIUM CHLORIDE 600; 310; 30; 20 MG/100ML; MG/100ML; MG/100ML; MG/100ML
100 INJECTION, SOLUTION INTRAVENOUS CONTINUOUS
Status: DISCONTINUED | OUTPATIENT
Start: 2023-11-30 | End: 2023-12-01 | Stop reason: HOSPADM

## 2023-11-30 RX ORDER — FENTANYL CITRATE 50 UG/ML
INJECTION, SOLUTION INTRAMUSCULAR; INTRAVENOUS AS NEEDED
Status: DISCONTINUED | OUTPATIENT
Start: 2023-11-30 | End: 2023-11-30

## 2023-11-30 RX ORDER — ONDANSETRON HYDROCHLORIDE 2 MG/ML
INJECTION, SOLUTION INTRAVENOUS AS NEEDED
Status: DISCONTINUED | OUTPATIENT
Start: 2023-11-30 | End: 2023-11-30

## 2023-11-30 RX ORDER — ONDANSETRON HYDROCHLORIDE 2 MG/ML
4 INJECTION, SOLUTION INTRAVENOUS ONCE AS NEEDED
Status: DISCONTINUED | OUTPATIENT
Start: 2023-11-30 | End: 2023-12-01 | Stop reason: HOSPADM

## 2023-11-30 RX ORDER — PROPOFOL 10 MG/ML
INJECTION, EMULSION INTRAVENOUS CONTINUOUS PRN
Status: DISCONTINUED | OUTPATIENT
Start: 2023-11-30 | End: 2023-11-30

## 2023-11-30 RX ORDER — MIDAZOLAM HYDROCHLORIDE 1 MG/ML
INJECTION, SOLUTION INTRAMUSCULAR; INTRAVENOUS AS NEEDED
Status: DISCONTINUED | OUTPATIENT
Start: 2023-11-30 | End: 2023-11-30

## 2023-11-30 RX ORDER — PHENYLEPHRINE HCL IN 0.9% NACL 0.4MG/10ML
SYRINGE (ML) INTRAVENOUS AS NEEDED
Status: DISCONTINUED | OUTPATIENT
Start: 2023-11-30 | End: 2023-11-30

## 2023-11-30 RX ORDER — PROPOFOL 10 MG/ML
INJECTION, EMULSION INTRAVENOUS AS NEEDED
Status: DISCONTINUED | OUTPATIENT
Start: 2023-11-30 | End: 2023-11-30

## 2023-11-30 RX ORDER — ROCURONIUM BROMIDE 10 MG/ML
INJECTION, SOLUTION INTRAVENOUS AS NEEDED
Status: DISCONTINUED | OUTPATIENT
Start: 2023-11-30 | End: 2023-11-30

## 2023-11-30 RX ADMIN — LIDOCAINE HYDROCHLORIDE 100 MG: 20 INJECTION, SOLUTION INFILTRATION; PERINEURAL at 12:11

## 2023-11-30 RX ADMIN — PROPOFOL 200 MG: 10 INJECTION, EMULSION INTRAVENOUS at 12:11

## 2023-11-30 RX ADMIN — SODIUM CHLORIDE, SODIUM LACTATE, POTASSIUM CHLORIDE, AND CALCIUM CHLORIDE: 600; 310; 30; 20 INJECTION, SOLUTION INTRAVENOUS at 11:55

## 2023-11-30 RX ADMIN — PROPOFOL 20 MG: 10 INJECTION, EMULSION INTRAVENOUS at 14:11

## 2023-11-30 RX ADMIN — ROCURONIUM BROMIDE 20 MG: 10 INJECTION INTRAVENOUS at 13:12

## 2023-11-30 RX ADMIN — ROCURONIUM BROMIDE 20 MG: 10 INJECTION INTRAVENOUS at 13:35

## 2023-11-30 RX ADMIN — Medication 80 MCG: at 12:27

## 2023-11-30 RX ADMIN — ROCURONIUM BROMIDE 10 MG: 10 INJECTION INTRAVENOUS at 12:47

## 2023-11-30 RX ADMIN — DEXAMETHASONE SODIUM PHOSPHATE 4 MG: 4 INJECTION, SOLUTION INTRAMUSCULAR; INTRAVENOUS at 12:19

## 2023-11-30 RX ADMIN — SUGAMMADEX 200 MG: 100 INJECTION, SOLUTION INTRAVENOUS at 14:23

## 2023-11-30 RX ADMIN — FENTANYL CITRATE 100 MCG: 50 INJECTION, SOLUTION INTRAMUSCULAR; INTRAVENOUS at 12:11

## 2023-11-30 RX ADMIN — ROCURONIUM BROMIDE 30 MG: 10 INJECTION INTRAVENOUS at 12:11

## 2023-11-30 RX ADMIN — PROPOFOL 50 MG: 10 INJECTION, EMULSION INTRAVENOUS at 13:35

## 2023-11-30 RX ADMIN — Medication 120 MCG: at 12:31

## 2023-11-30 RX ADMIN — ONDANSETRON 4 MG: 2 INJECTION INTRAMUSCULAR; INTRAVENOUS at 13:45

## 2023-11-30 RX ADMIN — PROPOFOL 200 MCG/KG/MIN: 10 INJECTION, EMULSION INTRAVENOUS at 12:18

## 2023-11-30 RX ADMIN — SUGAMMADEX 200 MG: 100 INJECTION, SOLUTION INTRAVENOUS at 14:36

## 2023-11-30 RX ADMIN — MIDAZOLAM 2 MG: 1 INJECTION INTRAMUSCULAR; INTRAVENOUS at 12:04

## 2023-11-30 SDOH — HEALTH STABILITY: MENTAL HEALTH: CURRENT SMOKER: 0

## 2023-11-30 ASSESSMENT — PAIN SCALES - GENERAL
PAINLEVEL_OUTOF10: 0 - NO PAIN
PAIN_LEVEL: 0
PAINLEVEL_OUTOF10: 0 - NO PAIN
PAINLEVEL_OUTOF10: 0 - NO PAIN

## 2023-11-30 ASSESSMENT — ENCOUNTER SYMPTOMS
CHEST TIGHTNESS: 0
CHILLS: 0
FEVER: 0
NAUSEA: 0
ABDOMINAL PAIN: 0
DIZZINESS: 0
TROUBLE SWALLOWING: 0
HEMATURIA: 0
APNEA: 0
DIFFICULTY URINATING: 0
APPETITE CHANGE: 0
SINUS PAIN: 0
RESPIRATORY NEGATIVE: 1
ACTIVITY CHANGE: 0
PALPITATIONS: 0
HEADACHES: 0
VOMITING: 0
FATIGUE: 0
STRIDOR: 0
DYSURIA: 0
SEIZURES: 0
RHINORRHEA: 0
DIARRHEA: 0
HALLUCINATIONS: 0
CHOKING: 0
SORE THROAT: 0
MYALGIAS: 0
VOICE CHANGE: 0
JOINT SWELLING: 0
DIAPHORESIS: 0

## 2023-11-30 ASSESSMENT — COLUMBIA-SUICIDE SEVERITY RATING SCALE - C-SSRS
1. IN THE PAST MONTH, HAVE YOU WISHED YOU WERE DEAD OR WISHED YOU COULD GO TO SLEEP AND NOT WAKE UP?: NO
2. HAVE YOU ACTUALLY HAD ANY THOUGHTS OF KILLING YOURSELF?: NO
6. HAVE YOU EVER DONE ANYTHING, STARTED TO DO ANYTHING, OR PREPARED TO DO ANYTHING TO END YOUR LIFE?: NO

## 2023-11-30 ASSESSMENT — PAIN - FUNCTIONAL ASSESSMENT
PAIN_FUNCTIONAL_ASSESSMENT: 0-10

## 2023-11-30 NOTE — ANESTHESIA PREPROCEDURE EVALUATION
"Patient: Jeffery Dueñas \"Calvin\"    Procedure Information       Date/Time: 11/30/23 1100    Scheduled providers: Calvin Carver MD; Gabriel Ruano DO; Belkys Sood RN    Procedure: BRONCHOSCOPY    Location: [facility]            Relevant Problems   Anesthesia (within normal limits)      Cardiovascular   (+) Pain of right scapula (Resolved)       Clinical information reviewed:   Tobacco  Allergies  Meds   Med Hx  Surg Hx   Fam Hx  Soc Hx        NPO Detail:  NPO/Void Status  Date of Last Liquid: 11/29/23  Time of Last Liquid: 2130  Date of Last Solid: 11/29/23  Time of Last Solid: 2130  Last Intake Type: Clear fluids         Physical Exam    Airway  Mallampati: II  Neck ROM: full     Cardiovascular - normal exam     Dental - normal exam     Pulmonary - normal exam  Breath sounds clear to auscultation     Abdominal - normal exam             Anesthesia Plan    ASA 1     general     The patient is not a current smoker.    intravenous induction   Postoperative administration of opioids is intended.  Trial extubation is planned.  Anesthetic plan and risks discussed with patient.    Plan discussed with CAA.      "

## 2023-11-30 NOTE — ANESTHESIA PROCEDURE NOTES
Airway  Date/Time: 11/30/2023 12:16 PM  Urgency: elective    Airway not difficult    Staffing  Performed: MIHIR   Authorized by: Gabriel Ruano DO    Performed by: MIHIR Gallegos  Patient location during procedure: OR    Indications and Patient Condition  Indications for airway management: anesthesia and airway protection  Spontaneous ventilation: present  Sedation level: deep  Preoxygenated: yes  Patient position: sniffing  Mask difficulty assessment: 1 - vent by mask    Final Airway Details  Final airway type: endotracheal airway      Successful airway: ETT  Cuffed: yes   Successful intubation technique: direct laryngoscopy  Facilitating devices/methods: intubating stylet  Blade: Benjamin  Blade size: #4  ETT size (mm): 8.5  Cormack-Lehane Classification: grade I - full view of glottis  Placement verified by: capnometry and palpation of cuff   Measured from: lips  ETT to lips (cm): 21  Number of attempts at approach: 1

## 2023-11-30 NOTE — DISCHARGE INSTRUCTIONS
The anesthetics, sedatives or narcotics which were given to you today will be acting in your body for the next 24 hours, so you might feel a little sleepy or groggy. This feeling should slowly wear off.   Carefully read and follow the instructions below:   You received sedation today.   Do not drive or operate machinery or power tools of any kind.   No alcoholic beverages today, not even beer or wine.   No over the counter medications that contain alcohol or may cause drowsiness.   Do not make important decisions or sign legal documents.     Do not use Aspirin containing products or non-steroidal medications for the next 24 hours.  (Examples of these types of medications include: Advil, Aleve, Ecotrin, Ibuprofen, Motrin or Naprosyn.  This list is not all-inclusive.  Check with your physician or pharmacist before resuming these medications.)  Tylenol, cough medicine, cough drops or throat lozenges may be used when you are allowed to resume eating and drinking.     Call your physician if any of these symptoms occur:   High fever over 101 degrees or chills (a low grade fever is common for 24 hours)   Rash or hives   Persistent nausea or vomiting   Inability to urinate within 8 hours after the procedure  Go directly to the emergency room if you notice any of the following:   Shortness of breath   Chest pain  Coughing up large amounts of bright red blood greater than a teaspoonful of blood clots (about a teaspoonful for the next 24-48 hours is normal, especially if you had a biopsy)  Resume all normal medications unless directed otherwise by your doctor.     Your doctor recommends these additional instructions:    Continue regular medications      Follow up with your referring physician as previously scheduled.    If you experience any problems or have any questions following discharge, please call:   Before 5 pm: [phone]   After 5pm and on weekends: [phone] / [phone] and ask for the Pulmonary Fellow  on-call (Pager Number: [phone])

## 2023-11-30 NOTE — H&P
History Of Present Illness    Calvin Dueñas is a 53 y.o. male , non smoker, presenting with suspicious  1.4cm  right upper lobe nodule and right  paratracheal lymphadenopathy.     He also denies SOB, chronic cough, wheezing, and clear, green, blood streaks, but no sputum. No night cough. No hemoptysis. No fever or shivering chills.     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    Other chest pain 08/31/2018    Atypical chest pain    Other conditions influencing health status 06/30/2017    Injury of triangular fibrocartilage complex (TFCC), left, subsequent encounter    Other synovitis and tenosynovitis, left forearm 05/09/2017    Extensor tenosynovitis of left wrist    Overexertion from repetitive movements, initial encounter 01/10/2017    Overuse injury    Pain in right hip 11/19/2019    Right hip pain    Pain in unspecified wrist 01/05/2017    Wrist pain    Personal history of other diseases of the digestive system 12/14/2018    History of hemorrhoids    Personal history of other diseases of the musculoskeletal system and connective tissue 07/21/2016    History of muscle spasm    Personal history of other diseases of the musculoskeletal system and connective tissue 07/23/2016    History of back pain    Personal history of other diseases of the respiratory system 11/22/2019    History of paranasal sinus congestion    Personal history of other diseases of the respiratory system 03/13/2017    History of paranasal sinus congestion    Personal history of other diseases of the respiratory system      History of bronchitis    Personal history of other diseases of the respiratory system 10/28/2013    History of acute bronchitis    Personal history of other specified conditions 12/27/2018    History of heartburn    Personal history of other specified conditions     History of dizziness    Personal history of other specified conditions 10/12/2018    History of diarrhea    Personal history of other specified conditions     History of heartburn    Unspecified sprain of left wrist, initial encounter 05/09/2017    Left wrist sprain    Unspecified sprain of unspecified shoulder joint, initial encounter 06/27/2013    Shoulder sprain       Surgical History  History reviewed. No pertinent surgical history.     Social History  He reports that he has quit smoking. His smoking use included cigarettes. He has a 0.10 pack-year smoking history. He has never used smokeless tobacco. He reports that he does not currently use alcohol. He reports that he does not currently use drugs after having used the following drugs: Marijuana.    Family History  Family History   Problem Relation Name Age of Onset    Psoriasis Father      Idiopathic pulmonary fibrosis Father      Other (stroke syndrome) Father      Pulmonary fibrosis Father      Colon cancer Brother          Allergies  Patient has no known allergies.    Review of Systems   Constitutional:  Negative for activity change, appetite change, chills, diaphoresis, fatigue and fever.   HENT:  Negative for congestion, ear discharge, hearing loss, postnasal drip, rhinorrhea, sinus pain, sneezing, sore throat, trouble swallowing and voice change.    Eyes:  Negative for visual disturbance.   Respiratory: Negative.  Negative for apnea, choking, chest tightness and stridor.    Cardiovascular:  Negative for chest pain, palpitations and leg swelling.   Gastrointestinal:  Negative for abdominal pain, diarrhea, nausea and vomiting.   Genitourinary:  Negative for difficulty urinating, dysuria and  "hematuria.   Musculoskeletal:  Negative for joint swelling and myalgias.   Skin:  Negative for pallor and rash.   Neurological:  Negative for dizziness, seizures, syncope and headaches.   Psychiatric/Behavioral:  Negative for hallucinations and suicidal ideas.             Physical Exam  Constitutional:       General: He is not in acute distress.  HENT:      Head: Normocephalic and atraumatic.      Mouth/Throat:      Mouth: Mucous membranes are moist.   Eyes:      Extraocular Movements: Extraocular movements intact.      Pupils: Pupils are equal, round, and reactive to light.   Cardiovascular:      Rate and Rhythm: Normal rate and regular rhythm.      Heart sounds: No murmur heard.     No friction rub. No gallop.   Pulmonary:      Effort: No respiratory distress.      Breath sounds: Normal breath sounds. No stridor. No wheezing, rhonchi or rales.   Abdominal:      General: Bowel sounds are normal.      Palpations: Abdomen is soft.      Tenderness: There is no abdominal tenderness.   Skin:     General: Skin is warm.      Findings: No lesion or rash.   Neurological:      General: No focal deficit present.      Mental Status: He is alert and oriented to person, place, and time.   Psychiatric:         Mood and Affect: Mood normal.          Last Recorded Vitals  Blood pressure 134/78, pulse 78, temperature 36.4 °C (97.5 °F), temperature source Temporal, resp. rate 18, height 1.727 m (5' 8\"), weight 67.1 kg (148 lb), SpO2 97 %.    Relevant Results    CT chest wo IV contrast for [facility] planning    Result Date: 11/28/2023  Interpreted By:  Santos Cai, STUDY: CT CHEST WITHOUT FOR [facility] PLANNING;  11/28/2023 10:37 am   INDICATION: Signs/Symptoms:pulmonary nodule.   COMPARISON: November 25, 2020 CT calcium score examination.   ACCESSION NUMBER(S): [id number]   ORDERING CLINICIAN: RADHA CAMPOS   TECHNIQUE: Noncontrast CT images of the chest were performed with coronal and sagittal reformatted images.   FINDINGS: " SUPPORT DEVICES:   None.   CHEST WALL AND LOWER NECK:   No supraclavicular or axillary lymphadenopathy by CT size criteria.   MEDIASTINUM AND TITI:   Redemonstrated prominence of  right paratracheal nodes measuring up to 12 mm short axis series 21, image 157.   VESSELS:   The thoracic aorta and main pulmonary artery are normal in caliber.   HEART:   The heart size is normal.  No pericardial effusion.   LUNG, PLEURA, LARGE AIRWAYS:   Unchanged right upper lobe nodule with air bronchogram measuring 16 x 16 mm series 204, image 157.   Mild scattered smaller nodules with reference nodules including: Anterior right upper lobe 2 mm nodule image 174. 3 mm nodule right base image 315, and adjacent left lower lobe nodules left lower lobe largest measuring 5 mm image 273.   UPPER ABDOMEN:   The liver demonstrates normal morphology with mild scattered subcentimeter homogeneous hypodensities which are too small to characterize although most suggestive of cysts including 6 mm hypodensity series 202, image 67. There is a nodular bandlike hypodensity segment 5 measuring 8 mm transverse diameter. The finding could be due to artifact given bandlike shaped versus nonspecific underlying liver lesion. Attention recommended on follow-up assessment.   BONES:   No aggressive osseous lesions.       Unchanged suspicious nonspecific right upper lobe nodule and right paratracheal lymphadenopathy.   Mild additional scattered tiny nonspecific pulmonary nodules. Attention recommended on follow-up assessment.   Scattered subcentimeter liver hypodensities the majority of which are most suggestive of cysts. Dominant hypodensity is indeterminate potentially artifact versus nonspecific   Incidental Finding:  There is a hypodense lesion measuring less than 10 mm within the liver.  (**-YCF-**)   Instructions: No known malignancy, hepatic dysfunction/risk factors: favor benign etiology, no further imaging recommended. High-risk patients: consider  outpatient liver MRI in 3-6 months (or earlier if needed). Rio RM, Rodrick PJ, Joshua KJ, et al. (Management of Incidental Liver Lesions on CT: A White Paper of the ACR Incidental Findings Committee. J Am Calin Radiol. 2017;14(11):3269-8764.) [email or web address]     Signed by: Santos Cai 11/28/2023 12:21 PM Dictation workstation:   [id number]    CT cardiac scoring wo IV contrast    Result Date: 11/26/2023  Interpreted By:  Arturo Giles, STUDY: CT CARDIAC SCORING WO IV CONTRAST;  11/25/2023 10:32 am   INDICATION: Signs/Symptoms:screening.   COMPARISON: None.   ACCESSION NUMBER(S): [id number]   ORDERING CLINICIAN: RADHA CAMPOS   TECHNIQUE: Using prospective ECG gating, CT scan of the coronary arteries was performed without intravenous contrast. Coronary calcium scoring  was performed according to the method of Agatston.   FINDINGS: The score and distribution of calcium in the coronary arteries is as follows:   LM             0 LAD           0 LCx            0 RCA           0   Total         0   The visualized mid/lower ascending thoracic aorta measures 2.9 cm in diameter. The heart is normal in size. No pericardial effusion is present.   There is increased size of right paratracheal lymph nodes measuring 1.3 cm in dimension... The visualized segments of the lungs demonstrates an irregular 16 mm right upper lobe nodular soft tissue density..   The visualized subdiaphragmatic structures appear intact.       1. Coronary artery calcium score of  0*. 2. Suspicious 16 mm right upper lobe parenchymal lung nodule with right paratracheal lymphadenopathy. 3. While this may be infectious/inflammatory in nature an underlying bronchogenic neoplasm can not be excluded. 4. Recommend contrasted chest CT and thoracic surgical consultation.   *Coronary artery calcium scoring may be helpful in predicting the risk for future coronary heart disease events.  According to the American College of Cardiology Foundation Clinical  "Expert Consensus Task Force, such testing provides important prognostic information in patients with more than one coronary heart disease risk factor. The coronary artery calcium score correlates with the annual risk of a non-fatal myocardial infarction or coronary heart disease death.   Coronary artery score            Annual Risk   0-99                               0.4% 100-399                          1.3% >400                              2.4%   These three \"breakpoints\" correspond to lower, intermediate and high risk states for future coronary events.  Such information should be used, along with appropriate clinical judgment, to make decisions regarding the intensity of risk factor management strategies to treat blood lipids and to modify other non-lipid coronary risk factors.   Reference: Buhl P et al. Circulation.  2007; 115:402-426   MACRO: Critical Finding:  See findings. Notification was initiated on 11/26/2023 at 8:16 am by  Arturo Giles.  (**-YCF-**) Instructions:   Signed by: Arturo Giles 11/26/2023 8:16 AM Dictation workstation:   [id number]    Echocardiogram stress test    Result Date: 11/9/2023    [facility], [address] [place] [address]              Tel [phone] and Fax [phone]  Exercise Stress Echo and Definity Contrast Patient Name:      JOHN HUMMEL       Ordering Provider:     [id number] RADHA CAMPOS Study Date:        11/9/2023           Reading Physician:     [id number] Rodney DO MRN/PID:           [id number]            Supervising Physician: [id number] Rodney DO Accession#:        [id number]        Fellow: Date of Birth/Age: 1970 / 52     Fellow:                    years Gender:            M                   Nurse:                 Tameka Fischer            "                                                    RN Admit Date:        11/9/2023           Technician: Admission Status:  Outpatient          Sonographer:           Avis Barker                                                               RDCS Height:            172.72 cm           Technologist:          Anamaria Flores                                                               CVT Weight:            69.40 kg            Additional Staff: BSA:               1.82 m2             Encounter#:            [id number] BMI:               23.26 kg/m2         Patient Location:      [facility] Non                                                               Invasive Study Type:    ECHOCARDIOGRAM STRESS TEST Diagnosis/ICD: Chest pain, unspecified-R07.9 Indication:    Chest Pain Atypical CPT Code:      Stress Echo-62753; Stress Test Interpretation-28928; Stress Test                Supervision-46043 Falls Risk: Low: Patient has a low risk for sustaining a fall; enviromental safety interventions in place.  Study Details: Correct procedure and correct patient verified verbally and with                ID Band checked.  Patient History: . 52 y.o. male presents to be evaluated for Atypical chest pain. PMH includes GERD. Allergies: None.  Medications: The patient's prescribed medication is Nexium, Proscar, Flonase.  Patient Performance: The patient exercised to stage IV on a Pradeep protocol for 12 minutes and 00 seconds, achieving 12.5 METS. The peak heart rate achieved was 166 bpm, which was 99 % of the age predicted target heart rate of 167 bpm. The resting blood pressure was 107/70 mmHg with a heart rate of 74 bpm. The standing blood pressure was 118/74 mmHg with a heart rate of 77 bpm. The patient's functional capacity was above average. The patient developed shortness of breath and fatigue during the stress exam. The symptoms resolved with rest. The blood pressure response was normal. The test was terminated due to: fatigue.  Patient has met the discharge criteria and is discharged to home.  Baseline ECG: Resting ECG showed normal sinus rhythm with PVC's.  Stress ECG: Stress ECG showed sinus tachycardia, with PVC's, PVC couplets.  Stress Stage Data: +---+------+-------+----+------------------------------------------+ HR Sys BPDias BPMETSComments                                   +---+------+-------+----+------------------------------------------+ 74 107   70                                                    +---+------+-------+----+------------------------------------------+ 77 118   74                                                    +---+------+-------+----+------------------------------------------+                     Denies baseline chest discomfort. SpO2 98% +---+------+-------+----+------------------------------------------+ 85 114   64         No symptoms.                               +---+------+-------+----+------------------------------------------+ 857241   66         No symptoms. SpO2 98%                      +---+------+-------+----+------------------------------------------+ 466486   70         Slight SOB, denies chest discomfort.       +---+------+-------+----+------------------------------------------+ 880288   66     12.5SOB, fatigue, no further symptoms.         +---+------+-------+----+------------------------------------------+  Recovery ECG: Recovery ECG showed normal sinus rhythm, with PVC's.  +------------+---+------+-------+-------------------------------------------+             HR Sys BPDias BPComments                                    +------------+---+------+-------+-------------------------------------------+ Recovery I  [id number]   56     SOB, fatigue, no further symptoms. SpO2 99% +------------+---+------+-------+-------------------------------------------+ Recovery II [id number]   58     SOB resolving,                               +------------+---+------+-------+-------------------------------------------+ Recovery III97 122   68     SOB resolved.                               +------------+---+------+-------+-------------------------------------------+ Recovery IV 93 106   70     No symptoms. SpO2 99%                       +------------+---+------+-------+-------------------------------------------+  Baseline Echo: Definity used as a contrast agent for endocardial border definition. Total contrast used for this procedure was 5 mL via IV push. Global LV systolic function is low normal. The resting baseline ejection fraction was estimated at 50 to 55%. There are no regional wall motion abnormalities at baseline.  Stress Echo: There are no stress induced regional wall motion abnormalities. The ejection fraction is approximately 70 to 75% at peak stress.  Summary:  1. The resting ejection fraction was estimated at 50 to 55% with a peak exercise ejection fraction estimated at 70 to 75%.  2. Low normal global left ventricular systolic function.  3. Adequate level of stress achieved.  4. No echocardiographic, clinical or electrocardiographic evidence for ischemia at a maximal workload.  5. Normal Stress Test. [id number] Asehr Rodney DO Electronically signed on 11/9/2023 at 2:42:48 PM   ** Final **        Assessment/Plan     53-year-old male with-    # Non smoker   # RUL nodule 1.4 cm and paratracheal LAD      Plan for navigational bronchoscopy and EBUS       Evert Spivey MD

## 2023-11-30 NOTE — ANESTHESIA POSTPROCEDURE EVALUATION
"Patient: Jeffery Dueñas \"Calvin\"    Procedure Summary       Date: 11/30/23 Room / Location: [facility]    Anesthesia Start: 1204 Anesthesia Stop: 1449    Procedure: BRONCHOSCOPY Diagnosis: Lung nodule    Scheduled Providers: Calvin Carver MD; Gabriel Ruano DO; Belkys Sood RN Responsible Provider: Gabriel Ruano DO    Anesthesia Type: general ASA Status: 1            Anesthesia Type: general    Vitals Value Taken Time   /73 11/30/23 1502   Temp 36.6 11/30/23 1502   Pulse 74 11/30/23 1502   Resp 16 11/30/23 1502   SpO2 100 11/30/23 1502       Anesthesia Post Evaluation    Patient location during evaluation: PACU  Patient participation: complete - patient participated  Level of consciousness: awake  Pain score: 0  Pain management: adequate  Multimodal analgesia pain management approach  Airway patency: patent  Two or more strategies used to mitigate risk of obstructive sleep apnea  Cardiovascular status: acceptable  Respiratory status: acceptable  Hydration status: acceptable  Postoperative Nausea and Vomiting: none  Comments: No nausea        There were no known notable events for this encounter.    "

## 2023-12-06 DIAGNOSIS — F41.9 ANXIETY: Primary | ICD-10-CM

## 2023-12-06 RX ORDER — TRAZODONE HYDROCHLORIDE 50 MG/1
50 TABLET ORAL NIGHTLY PRN
Qty: 30 TABLET | Refills: 0 | Status: SHIPPED | OUTPATIENT
Start: 2023-12-06 | End: 2023-12-13 | Stop reason: ALTCHOICE

## 2023-12-06 NOTE — PROGRESS NOTES
This was a phone visit, patient with lung mass, just had biopsy, results pending, having a lot of anxiety, not able to fall asleep.  Denies any thoughts of harming self or others.  No hallucinations.  Will put him on trazodone until biopsy comes back, we will reassess at that point.  Call me with any side effects.

## 2023-12-07 ENCOUNTER — TELEPHONE (OUTPATIENT)
Dept: GASTROENTEROLOGY | Facility: CLINIC | Age: 53
End: 2023-12-07
Payer: COMMERCIAL

## 2023-12-11 ENCOUNTER — APPOINTMENT (OUTPATIENT)
Dept: PULMONOLOGY | Facility: CLINIC | Age: 53
End: 2023-12-11
Payer: COMMERCIAL

## 2023-12-11 LAB
LABORATORY COMMENT REPORT: NORMAL
PATH REPORT.FINAL DX SPEC: NORMAL
PATH REPORT.GROSS SPEC: NORMAL
PATH REPORT.RELEVANT HX SPEC: NORMAL
PATH REPORT.TOTAL CANCER: NORMAL

## 2023-12-11 NOTE — RESULT ENCOUNTER NOTE
Esophagus: no Castillo's. Just GERD.  Stomach: no bacteria/h. Pyloris.  Colon polyps: tubular adenomas. Repeat colon 5 years.  Left msg with results.

## 2023-12-12 LAB
LAB AP ASR DISCLAIMER: NORMAL
LAB AP ASR DISCLAIMER: NORMAL
LABORATORY COMMENT REPORT: NORMAL
PATH REPORT.FINAL DX SPEC: NORMAL
PATH REPORT.FINAL DX SPEC: NORMAL
PATH REPORT.GROSS SPEC: NORMAL
PATH REPORT.GROSS SPEC: NORMAL
PATH REPORT.INTRAOP OBS SPEC DOC: NORMAL
PATH REPORT.TOTAL CANCER: NORMAL
PATH REPORT.TOTAL CANCER: NORMAL

## 2023-12-13 ENCOUNTER — OFFICE VISIT (OUTPATIENT)
Dept: GASTROENTEROLOGY | Facility: CLINIC | Age: 53
End: 2023-12-13
Payer: COMMERCIAL

## 2023-12-13 ENCOUNTER — APPOINTMENT (OUTPATIENT)
Dept: PULMONOLOGY | Facility: HOSPITAL | Age: 53
End: 2023-12-13
Payer: COMMERCIAL

## 2023-12-13 ENCOUNTER — LAB (OUTPATIENT)
Dept: LAB | Facility: LAB | Age: 53
End: 2023-12-13
Payer: COMMERCIAL

## 2023-12-13 VITALS — BODY MASS INDEX: 23.22 KG/M2 | WEIGHT: 153.2 LBS | HEIGHT: 68 IN | OXYGEN SATURATION: 99 % | HEART RATE: 70 BPM

## 2023-12-13 DIAGNOSIS — R14.0 BLOATING: Primary | ICD-10-CM

## 2023-12-13 DIAGNOSIS — R14.0 BLOATING: ICD-10-CM

## 2023-12-13 DIAGNOSIS — R91.1 PULMONARY NODULE: ICD-10-CM

## 2023-12-13 DIAGNOSIS — R91.1 PULMONARY NODULE: Primary | ICD-10-CM

## 2023-12-13 LAB — TTG IGA SER IA-ACNC: <1 U/ML

## 2023-12-13 PROCEDURE — 83516 IMMUNOASSAY NONANTIBODY: CPT

## 2023-12-13 PROCEDURE — 87385 HISTOPLASMA CAPSUL AG IA: CPT

## 2023-12-13 PROCEDURE — 36415 COLL VENOUS BLD VENIPUNCTURE: CPT

## 2023-12-13 PROCEDURE — 1036F TOBACCO NON-USER: CPT | Performed by: INTERNAL MEDICINE

## 2023-12-13 PROCEDURE — 99213 OFFICE O/P EST LOW 20 MIN: CPT | Performed by: INTERNAL MEDICINE

## 2023-12-13 RX ORDER — FINASTERIDE 1 MG/1
1 TABLET, FILM COATED ORAL DAILY
COMMUNITY

## 2023-12-13 RX ORDER — HYDROGEN PEROXIDE 3 %
20 SOLUTION, NON-ORAL MISCELLANEOUS
COMMUNITY
End: 2023-12-15 | Stop reason: SDUPTHER

## 2023-12-13 NOTE — PATIENT INSTRUCTIONS
Benefiber tspn 1-2 times a day    NO Castillo's    Try Lactaid right before eating milk products and stick to the lower lactose foods like more aged cheeses.    Try increasing the Nexium to 40 mg (two of the ones you have) 30-60 minutes before eating -- if not better, likely some other cause.

## 2023-12-13 NOTE — PROGRESS NOTES
"Subjective     History of Present Illness:   Jeffery Dueñas \"Calvin\" is a 53 y.o. male who presents to GI clinic for GERD.  Generally well controlled with Nexium 20 mg daily.  Does have bloating after lactose or higher sodium foods.    Occasionally hemorrhoid swells if more constipated - maybe every 1-2 weeks if he hasn't had a BM for a day or two. Then sometimes some seepage.    Review of Systems  Review of Systems    Social History   reports that he has quit smoking. His smoking use included cigarettes. He has a 0.10 pack-year smoking history. He has never used smokeless tobacco. He reports that he does not currently use alcohol. He reports that he does not currently use drugs after having used the following drugs: Marijuana.     Allergies  No Known Allergies    Medications  Current Outpatient Medications   Medication Instructions    esomeprazole (NEXIUM) 20 mg, oral, Daily before breakfast, Do not open capsule.    finasteride (PROPECIA) 1 mg, oral, Daily, Do not crush, chew, or split.    fluticasone (Flonase) 50 mcg/actuation nasal spray 1 spray, Each Nostril, 2 times daily PRN    hydrocortisone 2.5 % cream 1 Application, Topical, As needed,  APPLY 2-3 TIMES A DAY TO AFFECTED AREA(S)        Objective   Visit Vitals  Pulse 70      Physical Exam  Constitutional:       Appearance: Normal appearance.   HENT:      Mouth/Throat:      Mouth: Mucous membranes are moist.      Pharynx: Oropharynx is clear.   Eyes:      Extraocular Movements: Extraocular movements intact.      Pupils: Pupils are equal, round, and reactive to light.   Cardiovascular:      Rate and Rhythm: Normal rate and regular rhythm.      Heart sounds: No murmur heard.  Pulmonary:      Effort: Pulmonary effort is normal.      Breath sounds: Normal breath sounds.   Abdominal:      General: Abdomen is flat. Bowel sounds are normal.      Palpations: Abdomen is soft. There is no mass.   Skin:     General: Skin is warm and dry.   Neurological:      Mental Status: " "He is alert.   Psychiatric:         Mood and Affect: Mood normal.         Behavior: Behavior normal.         Thought Content: Thought content normal.         Judgment: Judgment normal.                     No lab exists for component: \"LABALBU\"        Assessment/Plan   Jeffery Dueñas \"Calvin\" is a 53 y.o. male who presents to GI clinic for bloating -  ? IBS ? Celiac ? SIBO    GERD - well controlled..      Calvin Mann MD         "

## 2023-12-14 PROBLEM — K21.9 GASTROESOPHAGEAL REFLUX DISEASE WITHOUT ESOPHAGITIS: Status: ACTIVE | Noted: 2023-12-14

## 2023-12-14 PROBLEM — R14.0 BLOATING: Status: ACTIVE | Noted: 2023-12-14

## 2023-12-15 DIAGNOSIS — K21.9 GASTROESOPHAGEAL REFLUX DISEASE WITHOUT ESOPHAGITIS: ICD-10-CM

## 2023-12-15 LAB
CELL COUNT (BLOOD): 1.98 X10*3/UL
CELL POPULATIONS: NORMAL
DIAGNOSIS: NORMAL
FLOW DIFFERENTIAL: NORMAL
FLOW TEST ORDERED: NORMAL
LAB TEST METHOD: NORMAL
NUMBER OF CELLS COLLECTED: NORMAL
PATH REPORT.TOTAL CANCER: NORMAL
SIGNATURE COMMENT: NORMAL
SPECIMEN VIABILITY: NORMAL

## 2023-12-15 PROCEDURE — 88184 FLOWCYTOMETRY/ TC 1 MARKER: CPT | Mod: TC | Performed by: INTERNAL MEDICINE

## 2023-12-15 RX ORDER — HYDROGEN PEROXIDE 3 %
20 SOLUTION, NON-ORAL MISCELLANEOUS
Qty: 90 CAPSULE | Refills: 1 | Status: SHIPPED | OUTPATIENT
Start: 2023-12-15 | End: 2023-12-18 | Stop reason: ALTCHOICE

## 2023-12-16 LAB
H CAPSUL AG UR QL: NOT DETECTED
SCAN RESULT: NORMAL
SCAN RESULT: NORMAL

## 2023-12-18 DIAGNOSIS — K21.9 GASTROESOPHAGEAL REFLUX DISEASE WITHOUT ESOPHAGITIS: Primary | ICD-10-CM

## 2023-12-18 LAB
FUNGUS SPEC CULT: NORMAL
FUNGUS SPEC FUNGUS STN: NORMAL

## 2023-12-18 RX ORDER — ESOMEPRAZOLE MAGNESIUM 40 MG/1
40 CAPSULE, DELAYED RELEASE ORAL
Qty: 90 CAPSULE | Refills: 3 | Status: SHIPPED | OUTPATIENT
Start: 2023-12-18 | End: 2024-12-17

## 2023-12-19 ENCOUNTER — OFFICE VISIT (OUTPATIENT)
Dept: INFECTIOUS DISEASES | Facility: CLINIC | Age: 53
End: 2023-12-19
Payer: COMMERCIAL

## 2023-12-19 VITALS
HEART RATE: 64 BPM | SYSTOLIC BLOOD PRESSURE: 114 MMHG | WEIGHT: 154 LBS | DIASTOLIC BLOOD PRESSURE: 74 MMHG | HEIGHT: 68 IN | TEMPERATURE: 98.3 F | BODY MASS INDEX: 23.34 KG/M2

## 2023-12-19 DIAGNOSIS — B39.4 HISTOPLASMA CAPSULATUM INFECTION: Primary | ICD-10-CM

## 2023-12-19 PROCEDURE — 99204 OFFICE O/P NEW MOD 45 MIN: CPT | Performed by: INTERNAL MEDICINE

## 2023-12-19 PROCEDURE — 1036F TOBACCO NON-USER: CPT | Performed by: INTERNAL MEDICINE

## 2023-12-19 RX ORDER — ITRACONAZOLE 100 MG/1
200 CAPSULE ORAL 2 TIMES DAILY
Qty: 120 CAPSULE | Refills: 2 | Status: SHIPPED | OUTPATIENT
Start: 2023-12-19 | End: 2023-12-19 | Stop reason: SDUPTHER

## 2023-12-19 RX ORDER — ITRACONAZOLE 100 MG/1
100 CAPSULE ORAL 2 TIMES DAILY
Qty: 168 CAPSULE | Refills: 1 | Status: SHIPPED | OUTPATIENT
Start: 2023-12-19 | End: 2023-12-20 | Stop reason: SDUPTHER

## 2023-12-19 RX ORDER — ITRACONAZOLE 100 MG/1
100 CAPSULE ORAL DAILY
Qty: 168 CAPSULE | Refills: 1 | Status: SHIPPED | OUTPATIENT
Start: 2023-12-19 | End: 2023-12-19 | Stop reason: SDUPTHER

## 2023-12-19 RX ORDER — ITRACONAZOLE 100 MG/1
200 CAPSULE ORAL 2 TIMES DAILY
Qty: 168 CAPSULE | Refills: 2 | Status: SHIPPED | OUTPATIENT
Start: 2023-12-19 | End: 2023-12-19 | Stop reason: ALTCHOICE

## 2023-12-19 ASSESSMENT — PAIN SCALES - GENERAL: PAINLEVEL: 0-NO PAIN

## 2023-12-19 NOTE — LETTER
12/19/23    Ventura Leigh MD  [address] [facility] [address] [facility]  [facility] [address]      Dear Dr. Ventura Leigh MD,    I am writing to confirm that your patient, Calvin Dueñas, received care in my office on 12/19/23. I have enclosed a summary of the care provided to Calvin for your reference.    Please contact me with any questions you may have regarding the visit.    Sincerely,         Evangelista Roberts MD  [id number]  [facility] [address]  [facility] [address]    CC: No Recipients negative...

## 2023-12-19 NOTE — PATIENT INSTRUCTIONS
We discussed treating you for pulmonary histoplamosis  You should get a blood test 3 weeks after starting the meds  Call [phone] with questions, more message via EPIC

## 2023-12-19 NOTE — LETTER
12/19/23    Vetnura Leigh MD  [address] [facility]   [facility]  [facility] [address]      Dear Dr. Ventura Leigh MD,    Thank you for referring your patient, Calvin Dueñas, to receive care in my office. I have enclosed a summary of the care provided to Calvin on 12/19/23.    Please contact me with any questions you may have regarding the visit.    Sincerely,         Evangelista Roberts MD  [address]  [facility] [address]  [facility] [address]    CC: No Recipients

## 2023-12-19 NOTE — PROGRESS NOTES
"Infectious Diseases Clinic Consult Note:    Referred by Ventura Broderick MD: Ventura Leigh MD  Reason for Consult: Pulmonary Histoplasmosis    History of Current Issue  Jeffery Dueñas is a 53 y.o. year old male with past medical history of acid reflux was referred to our clinic for suspicion of histoplasmosis.    Patient follows up with his PCP and was referred for CT cardiac scoring and stress test.  Stress test was within normal limits.  CT cardiac score was significant for an incidental right lung adenopathy.  Patient was referred to pulmonology on 11/29/2023 and was recommended bronchoscopy and EBUS, which was done on 11/30/2023.  Surgical pathology showed \"multiple fragments of lung parenchyma with acute and chronic inflammatory cell infiltrate, poorly formed nonnecrotizing granulomata and organizing pneumonia\".  Cytology also showed \"necrotizing granulomatous inflammation along with presence of small yeastlike fungal elements strongly suggestive of histoplasma on silver stain\".  Fungal culture and smear was negative.  Flow cytometry of lymph node did not show any clonal B-cell or T-cell population.  Histoplasma antigen was not detected on 12/13.  And TTGA was negative.    Patient denies shortness of breath, chronic cough, wheezing, fevers, chills or weight loss.  Patient has 0.10-pack-year smoking history, denies alcohol use or any other drug use.    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    Other chest pain 08/31/2018    Atypical chest pain    Other conditions influencing health status 06/30/2017    Injury of triangular " fibrocartilage complex (TFCC), left, subsequent encounter    Other synovitis and tenosynovitis, left forearm 05/09/2017    Extensor tenosynovitis of left wrist    Overexertion from repetitive movements, initial encounter 01/10/2017    Overuse injury    Pain in right hip 11/19/2019    Right hip pain    Pain in unspecified wrist 01/05/2017    Wrist pain    Personal history of other diseases of the digestive system 12/14/2018    History of hemorrhoids    Personal history of other diseases of the musculoskeletal system and connective tissue 07/21/2016    History of muscle spasm    Personal history of other diseases of the musculoskeletal system and connective tissue 07/23/2016    History of back pain    Personal history of other diseases of the respiratory system 11/22/2019    History of paranasal sinus congestion    Personal history of other diseases of the respiratory system 03/13/2017    History of paranasal sinus congestion    Personal history of other diseases of the respiratory system     History of bronchitis    Personal history of other diseases of the respiratory system 10/28/2013    History of acute bronchitis    Personal history of other specified conditions 12/27/2018    History of heartburn    Personal history of other specified conditions     History of dizziness    Personal history of other specified conditions 10/12/2018    History of diarrhea    Personal history of other specified conditions     History of heartburn    Unspecified sprain of left wrist, initial encounter 05/09/2017    Left wrist sprain    Unspecified sprain of unspecified shoulder joint, initial encounter 06/27/2013    Shoulder sprain     PAST SURGICAL HISTORY:  No past surgical history on file.  ALLERGIES:    No Known Allergies    MEDICATIONS:      Current Outpatient Medications:     esomeprazole (NexIUM) 40 mg DR capsule, Take 1 capsule (40 mg) by mouth once daily in the morning. Take before meals. Do not open capsule., Disp: 90  capsule, Rfl: 3    finasteride (Propecia) 1 mg tablet, Take 1 tablet (1 mg) by mouth once daily. Do not crush, chew, or split., Disp: , Rfl:     fluticasone (Flonase) 50 mcg/actuation nasal spray, Administer 1 spray into each nostril 2 times a day as needed., Disp: , Rfl:     hydrocortisone 2.5 % cream, Apply 1 Application topically if needed.  APPLY 2-3 TIMES A DAY TO AFFECTED AREA(S), Disp: , Rfl:     FAMILY HISTORY:   Family History   Problem Relation Name Age of Onset    Psoriasis Father      Idiopathic pulmonary fibrosis Father      Other (stroke syndrome) Father      Pulmonary fibrosis Father      Colon cancer Brother          SOCIAL HISTORY:       Marital Status:  Tobacco / Smokeless tobacco/ Vaping:   reports that he has quit smoking. His smoking use included cigarettes. He has a 0.10 pack-year smoking history. He has never used smokeless tobacco.   Alcohol:   Social History     Substance and Sexual Activity   Alcohol Use Not Currently      Drug Use:    Social History     Substance and Sexual Activity   Drug Use Not Currently    Types: Marijuana     IMMUNIZATIONS:    Immunization History   Administered Date(s) Administered    Influenza, Unspecified 12/02/2009, 10/09/2012, 09/25/2013, 10/23/2019    Influenza, seasonal, injectable 12/16/2021    Pfizer COVID-19 vaccine, bivalent, age 12 years and older (30 mcg/0.3 mL) 11/16/2022    Pfizer Purple Cap SARS-CoV-2 03/06/2021, 03/29/2021, 10/10/2021    Zoster, Unspecified 12/16/2021, 06/07/2022         PHYSICAL EXAMINATION:    Vitals:    12/19/23 1107   BP: 114/74   Pulse: 64   Temp: 36.8 °C (98.3 °F)      General: Patient is sitting in chair, in no acute distress.   HEENT: Anicteric sclera, no conjunctival pallor.   CVS: S1/S2 audible, no M/G/R.  Resp: Breathing comfortably on RA. Normal vesicular breathing. No wheezing, crackles or rhonchi auscultated.   Abd: Non distended, non tender  CNS: AAO x4. No gross focal deficits appreciated.  Skin: No rashes or wounds  "seen.       Laboratory Values and Test Results:   Lung Nodule Cytology:  A. LUNG FINE NEEDLE ASPIRATION RIGHT UPPER LOBE - RUL nodule R/O adenoma , CYTOLOGY AND CELL BLOCK:   --NO MALIGNANT CELLS IDENTIFIED.  --CELLULAR FINDINGS CONSISTENT WITH NECROTIZING GRANULOMATOUS INFLAMMATION.  --SILVER STAIN DEMONSTRATES THE PRESENCE OF SMALL YEAST-LIKE FUNGAL ELEMENTS STRONGLY SUGGESTIVE OF HISTOPLASMA.  --SPECIAL STAIN FOR ACID-FAST BACILLI IS NEGATIVE.      Imaging Studies:    CT Chest: 11/27:  Unchanged suspicious nonspecific right upper lobe nodule and right  paratracheal lymphadenopathy.      Mild additional scattered tiny nonspecific pulmonary nodules.  Attention recommended on follow-up assessment.      Scattered subcentimeter liver hypodensities the majority of which are  most suggestive of cysts. Dominant hypodensity is indeterminate  potentially artifact versus nonspecific      Incidental Finding:  There is a hypodense lesion measuring less than  10 mm within the liver.     ASSESSMENT / RECOMMENDATIONS:  Jeffery Dueñas is a 53 y.o. year old male with past medical history of acid reflux was referred to our clinic for suspicion of histoplasmosis.    Patient had a CT cardiac score done on 11/27/2023 which was significant for an incidental right lung adenopathy. He is also s/p bronchoscopy and EBUS, on 11/30/2023.  Surgical pathology showed \"multiple fragments of lung parenchyma with acute and chronic inflammatory cell infiltrate, poorly formed nonnecrotizing granulomata and organizing pneumonia\".  Cytology also showed \"necrotizing granulomatous inflammation along with presence of small yeastlike fungal elements strongly suggestive of histoplasma on silver stain\".  Fungal culture and smear was negative.  Histoplasma antigen was not detected on 12/13. Patient  reported that he is completely asymptomatic and has had no \"presumable exposures\".     Since patient has lymphadenopathy and paratracheal nodes, along with biopsy " showing granulomas and yeast on cytology will be reasonable to treat the patient to avoid future complications. Will start him on Itraconazole  mg BID x 6 weeks.     Clinical Impression: Pulmonary Histoplasmosis    Plan:  -Start Itraconazole 200 mg BID for 6 weeks.    -Will do liver panel 3 weeks into the treatment to monitor for side effects of itraconazole.    -Need for imaging based on patients clinical course.     Discussed with Dr Evangelista Jasso MD       PGY4, ID Fellow      Patient seen simultaneously with the fellow.  Chest CT shows 2 cm area which appears to be active histoplasmosis based upon pathology with some adenopathy and scattered nodules.  We have a long discussion regarding risks and benefits of therapy.  He is immunocompetent so we could clear this on his own with active lesions on stains some nodules and adenopathy we did recommend therapy.  We discussed risk of therapy.  We prescribed itraconazole 200 mg twice a day.  We recommend hepatic panel in 3 weeks.  Will be in touch with him.  Recommended total 6 weeks of therapy.

## 2023-12-20 RX ORDER — ITRACONAZOLE 100 MG/1
100 CAPSULE ORAL 2 TIMES DAILY
Qty: 168 CAPSULE | Refills: 1 | Status: SHIPPED | OUTPATIENT
Start: 2023-12-20 | End: 2024-01-31

## 2024-01-03 ENCOUNTER — APPOINTMENT (OUTPATIENT)
Dept: PULMONOLOGY | Facility: HOSPITAL | Age: 54
End: 2024-01-03
Payer: COMMERCIAL

## 2024-01-24 ENCOUNTER — LAB (OUTPATIENT)
Dept: LAB | Facility: LAB | Age: 54
End: 2024-01-24
Payer: COMMERCIAL

## 2024-01-24 ENCOUNTER — PATIENT MESSAGE (OUTPATIENT)
Dept: INFECTIOUS DISEASES | Facility: CLINIC | Age: 54
End: 2024-01-24

## 2024-01-24 DIAGNOSIS — B39.4 HISTOPLASMA CAPSULATUM INFECTION: ICD-10-CM

## 2024-01-24 DIAGNOSIS — B39.2: Primary | ICD-10-CM

## 2024-01-24 LAB
ALBUMIN SERPL BCP-MCNC: 4.3 G/DL (ref 3.4–5)
ALP SERPL-CCNC: 60 U/L (ref 33–120)
ALT SERPL W P-5'-P-CCNC: 40 U/L (ref 10–52)
AST SERPL W P-5'-P-CCNC: 23 U/L (ref 9–39)
BILIRUB DIRECT SERPL-MCNC: 0.3 MG/DL (ref 0–0.3)
BILIRUB SERPL-MCNC: 1.5 MG/DL (ref 0–1.2)
PROT SERPL-MCNC: 6.3 G/DL (ref 6.4–8.2)

## 2024-01-24 PROCEDURE — 80076 HEPATIC FUNCTION PANEL: CPT

## 2024-01-24 PROCEDURE — 36415 COLL VENOUS BLD VENIPUNCTURE: CPT

## 2024-01-30 ENCOUNTER — DOCUMENTATION (OUTPATIENT)
Dept: INTERNAL MEDICINE | Facility: HOSPITAL | Age: 54
End: 2024-01-30
Payer: COMMERCIAL

## 2024-01-30 NOTE — PROGRESS NOTES
We spoke this pm  He had significant symptoms/side effects  from itraconazole as outlined in the messages (LFTs nl)  The original indication for itraconaozole was soft- given his symptoms I recommended watchful waiting with a repeat CT in six months-

## 2024-02-13 NOTE — ADDENDUM NOTE
Encounter addended by: Mg Kay MD on: 2/12/2024 9:42 PM   Actions taken: Image edited, Charge Capture section accepted

## 2024-04-29 ENCOUNTER — TELEPHONE (OUTPATIENT)
Dept: PRIMARY CARE | Facility: CLINIC | Age: 54
End: 2024-04-29
Payer: COMMERCIAL

## 2024-06-13 DIAGNOSIS — B39.9 HISTOPLASMOSIS: Primary | ICD-10-CM

## 2024-06-13 DIAGNOSIS — R91.1 LUNG NODULE: ICD-10-CM

## 2024-07-01 ENCOUNTER — APPOINTMENT (OUTPATIENT)
Dept: RADIOLOGY | Facility: CLINIC | Age: 54
End: 2024-07-01
Payer: COMMERCIAL

## 2024-10-15 ENCOUNTER — APPOINTMENT (OUTPATIENT)
Dept: PRIMARY CARE | Facility: CLINIC | Age: 54
End: 2024-10-15
Payer: COMMERCIAL

## 2024-10-17 ENCOUNTER — LAB (OUTPATIENT)
Dept: LAB | Facility: LAB | Age: 54
End: 2024-10-17
Payer: COMMERCIAL

## 2024-10-17 ENCOUNTER — APPOINTMENT (OUTPATIENT)
Dept: PRIMARY CARE | Facility: CLINIC | Age: 54
End: 2024-10-17
Payer: COMMERCIAL

## 2024-10-17 VITALS
HEIGHT: 68 IN | SYSTOLIC BLOOD PRESSURE: 120 MMHG | OXYGEN SATURATION: 97 % | WEIGHT: 152 LBS | DIASTOLIC BLOOD PRESSURE: 78 MMHG | HEART RATE: 73 BPM | BODY MASS INDEX: 23.04 KG/M2

## 2024-10-17 DIAGNOSIS — L65.9 ALOPECIA: ICD-10-CM

## 2024-10-17 DIAGNOSIS — K21.9 GASTROESOPHAGEAL REFLUX DISEASE WITHOUT ESOPHAGITIS: ICD-10-CM

## 2024-10-17 DIAGNOSIS — D12.6 ADENOMATOUS POLYP OF COLON, UNSPECIFIED PART OF COLON: ICD-10-CM

## 2024-10-17 DIAGNOSIS — Z00.00 ROUTINE GENERAL MEDICAL EXAMINATION AT A HEALTH CARE FACILITY: Primary | ICD-10-CM

## 2024-10-17 DIAGNOSIS — Z00.00 ROUTINE GENERAL MEDICAL EXAMINATION AT A HEALTH CARE FACILITY: ICD-10-CM

## 2024-10-17 DIAGNOSIS — Z23 ENCOUNTER FOR IMMUNIZATION: ICD-10-CM

## 2024-10-17 LAB
ALBUMIN SERPL BCP-MCNC: 4.6 G/DL (ref 3.4–5)
ALP SERPL-CCNC: 68 U/L (ref 33–120)
ALT SERPL W P-5'-P-CCNC: 19 U/L (ref 10–52)
ANION GAP SERPL CALC-SCNC: 13 MMOL/L (ref 10–20)
APPEARANCE UR: CLEAR
AST SERPL W P-5'-P-CCNC: 18 U/L (ref 9–39)
BASOPHILS # BLD AUTO: 0.03 X10*3/UL (ref 0–0.1)
BASOPHILS NFR BLD AUTO: 0.3 %
BILIRUB SERPL-MCNC: 1.6 MG/DL (ref 0–1.2)
BILIRUB UR STRIP.AUTO-MCNC: NEGATIVE MG/DL
BUN SERPL-MCNC: 19 MG/DL (ref 6–23)
CALCIUM SERPL-MCNC: 9.8 MG/DL (ref 8.6–10.6)
CHLORIDE SERPL-SCNC: 100 MMOL/L (ref 98–107)
CHOLEST SERPL-MCNC: 203 MG/DL (ref 0–199)
CHOLESTEROL/HDL RATIO: 3.5
CO2 SERPL-SCNC: 30 MMOL/L (ref 21–32)
COLOR UR: NORMAL
CREAT SERPL-MCNC: 0.87 MG/DL (ref 0.5–1.3)
EGFRCR SERPLBLD CKD-EPI 2021: >90 ML/MIN/1.73M*2
EOSINOPHIL # BLD AUTO: 0.1 X10*3/UL (ref 0–0.7)
EOSINOPHIL NFR BLD AUTO: 1.1 %
ERYTHROCYTE [DISTWIDTH] IN BLOOD BY AUTOMATED COUNT: 11.8 % (ref 11.5–14.5)
GLUCOSE SERPL-MCNC: 94 MG/DL (ref 74–99)
GLUCOSE UR STRIP.AUTO-MCNC: NORMAL MG/DL
HCT VFR BLD AUTO: 48.5 % (ref 41–52)
HDLC SERPL-MCNC: 58.5 MG/DL
HGB BLD-MCNC: 16 G/DL (ref 13.5–17.5)
IMM GRANULOCYTES # BLD AUTO: 0.03 X10*3/UL (ref 0–0.7)
IMM GRANULOCYTES NFR BLD AUTO: 0.3 % (ref 0–0.9)
KETONES UR STRIP.AUTO-MCNC: NEGATIVE MG/DL
LDLC SERPL CALC-MCNC: 124 MG/DL
LEUKOCYTE ESTERASE UR QL STRIP.AUTO: NEGATIVE
LYMPHOCYTES # BLD AUTO: 2.8 X10*3/UL (ref 1.2–4.8)
LYMPHOCYTES NFR BLD AUTO: 31.9 %
MCH RBC QN AUTO: 28.1 PG (ref 26–34)
MCHC RBC AUTO-ENTMCNC: 33 G/DL (ref 32–36)
MCV RBC AUTO: 85 FL (ref 80–100)
MONOCYTES # BLD AUTO: 0.76 X10*3/UL (ref 0.1–1)
MONOCYTES NFR BLD AUTO: 8.7 %
NEUTROPHILS # BLD AUTO: 5.06 X10*3/UL (ref 1.2–7.7)
NEUTROPHILS NFR BLD AUTO: 57.7 %
NITRITE UR QL STRIP.AUTO: NEGATIVE
NON HDL CHOLESTEROL: 145 MG/DL (ref 0–149)
NRBC BLD-RTO: 0 /100 WBCS (ref 0–0)
PH UR STRIP.AUTO: 6 [PH]
PLATELET # BLD AUTO: 250 X10*3/UL (ref 150–450)
POTASSIUM SERPL-SCNC: 4.1 MMOL/L (ref 3.5–5.3)
PROT SERPL-MCNC: 7.2 G/DL (ref 6.4–8.2)
PROT UR STRIP.AUTO-MCNC: NEGATIVE MG/DL
PSA SERPL-MCNC: 0.75 NG/ML
RBC # BLD AUTO: 5.7 X10*6/UL (ref 4.5–5.9)
RBC # UR STRIP.AUTO: NEGATIVE /UL
SODIUM SERPL-SCNC: 139 MMOL/L (ref 136–145)
SP GR UR STRIP.AUTO: 1.02
TRIGL SERPL-MCNC: 103 MG/DL (ref 0–149)
UROBILINOGEN UR STRIP.AUTO-MCNC: NORMAL MG/DL
VLDL: 21 MG/DL (ref 0–40)
WBC # BLD AUTO: 8.8 X10*3/UL (ref 4.4–11.3)

## 2024-10-17 PROCEDURE — 90656 IIV3 VACC NO PRSV 0.5 ML IM: CPT | Performed by: INTERNAL MEDICINE

## 2024-10-17 PROCEDURE — 84153 ASSAY OF PSA TOTAL: CPT

## 2024-10-17 PROCEDURE — 81003 URINALYSIS AUTO W/O SCOPE: CPT

## 2024-10-17 PROCEDURE — 80053 COMPREHEN METABOLIC PANEL: CPT

## 2024-10-17 PROCEDURE — 85025 COMPLETE CBC W/AUTO DIFF WBC: CPT

## 2024-10-17 PROCEDURE — 90471 IMMUNIZATION ADMIN: CPT | Performed by: INTERNAL MEDICINE

## 2024-10-17 PROCEDURE — 99396 PREV VISIT EST AGE 40-64: CPT | Performed by: INTERNAL MEDICINE

## 2024-10-17 PROCEDURE — 80061 LIPID PANEL: CPT

## 2024-10-17 PROCEDURE — 3008F BODY MASS INDEX DOCD: CPT | Performed by: INTERNAL MEDICINE

## 2024-10-17 PROCEDURE — 36415 COLL VENOUS BLD VENIPUNCTURE: CPT

## 2024-10-17 RX ORDER — CLOBETASOL PROPIONATE 0.5 MG/G
CREAM TOPICAL
COMMUNITY
Start: 2024-05-08

## 2024-10-17 RX ORDER — PHENYLPROPANOLAMINE/CLEMASTINE 75-1.34MG
TABLET, EXTENDED RELEASE ORAL EVERY 8 HOURS PRN
COMMUNITY

## 2024-10-17 RX ORDER — ESOMEPRAZOLE MAGNESIUM 40 MG/1
40 CAPSULE, DELAYED RELEASE ORAL
Qty: 90 CAPSULE | Refills: 3 | Status: SHIPPED | OUTPATIENT
Start: 2024-10-17 | End: 2025-10-17

## 2024-10-17 RX ORDER — FINASTERIDE 1 MG/1
1 TABLET, FILM COATED ORAL DAILY
Qty: 90 TABLET | Refills: 2 | Status: SHIPPED | OUTPATIENT
Start: 2024-10-17 | End: 2025-10-17

## 2024-10-17 ASSESSMENT — LIFESTYLE VARIABLES
SKIP TO QUESTIONS 9-10: 1
HOW OFTEN DO YOU HAVE SIX OR MORE DRINKS ON ONE OCCASION: NEVER
HOW OFTEN DO YOU HAVE A DRINK CONTAINING ALCOHOL: 2-3 TIMES A WEEK
HOW MANY STANDARD DRINKS CONTAINING ALCOHOL DO YOU HAVE ON A TYPICAL DAY: 1 OR 2
AUDIT-C TOTAL SCORE: 3

## 2024-10-17 ASSESSMENT — COLUMBIA-SUICIDE SEVERITY RATING SCALE - C-SSRS
2. HAVE YOU ACTUALLY HAD ANY THOUGHTS OF KILLING YOURSELF?: NO
6. HAVE YOU EVER DONE ANYTHING, STARTED TO DO ANYTHING, OR PREPARED TO DO ANYTHING TO END YOUR LIFE?: NO
1. IN THE PAST MONTH, HAVE YOU WISHED YOU WERE DEAD OR WISHED YOU COULD GO TO SLEEP AND NOT WAKE UP?: NO

## 2024-10-17 ASSESSMENT — ENCOUNTER SYMPTOMS
DEPRESSION: 0
OCCASIONAL FEELINGS OF UNSTEADINESS: 0
LOSS OF SENSATION IN FEET: 0

## 2024-10-17 ASSESSMENT — PATIENT HEALTH QUESTIONNAIRE - PHQ9
2. FEELING DOWN, DEPRESSED OR HOPELESS: NOT AT ALL
1. LITTLE INTEREST OR PLEASURE IN DOING THINGS: NOT AT ALL
SUM OF ALL RESPONSES TO PHQ9 QUESTIONS 1 AND 2: 0

## 2024-10-17 NOTE — PROGRESS NOTES
"Subjective   Patient ID: Calvin Dueñas is a 53 y.o. male who presents for New Patient Visit and Annual Exam (Work requires patient to get an annual visit. ).    HPI patient presents to clinic to establish care.  He is requesting a routine physical examination.  He had been a former patient of Dr. Leigh .    Past medical history significant for pulmonary histoplasmosis treated with fluconazole for 3 weeks,,alopecia,calcium score of 0 ,right paratracheal adenopathy.gerd,colonic polyps,reactive gastropathy,palpitations,normal stress echo with ef of 55%.     Past surgical history notable for colonoscopy 11/20/2023 revealed multiple polyps required removal and biopsy revealed adenomatous polyp, status post EGD 4/11/2023 revealed biopsy showed reactive gastropathy, bronchoscopy and EBUS on 11/30/2023 revealed, surgical pathology showed fragments of lung parenchyma with inflammatory changes, poorly formed necrotizing granulomata and organizing pneumonia pending cytology report came back suggestive of history of plasma and other testing including flow cytometry of lymph node and histoplasma antigen came back negative.       Review of Systems   Constitutional: Negative.    HENT: Negative.     Eyes: Negative.    Respiratory: Negative.     Cardiovascular: Negative.    Gastrointestinal: Negative.    Endocrine: Negative.    Genitourinary: Negative.    Musculoskeletal: Negative.    Skin: Negative.    Allergic/Immunologic: Negative.    Neurological: Negative.    Hematological: Negative.    Psychiatric/Behavioral: Negative.         Objective   /78 (BP Location: Right arm, Patient Position: Sitting)   Pulse 73   Ht 1.727 m (5' 8\")   Wt 68.9 kg (152 lb)   SpO2 97%   BMI 23.11 kg/m²     Physical Exam  Constitutional:       Appearance: Normal appearance. He is normal weight.   HENT:      Right Ear: Tympanic membrane normal.      Left Ear: Tympanic membrane and ear canal normal.      Nose: Nose normal.   Neck:      Vascular: No " carotid bruit.   Cardiovascular:      Rate and Rhythm: Normal rate.   Pulmonary:      Effort: No respiratory distress.      Breath sounds: No stridor. No wheezing.   Abdominal:      Palpations: Abdomen is soft.      Tenderness: There is no abdominal tenderness. There is no guarding or rebound.   Skin:     Coloration: Skin is not jaundiced.      Findings: No bruising.   Neurological:      General: No focal deficit present.      Mental Status: He is alert and oriented to person, place, and time.   Psychiatric:         Mood and Affect: Mood normal.         Assessment/Plan   Assessment & Plan  Routine general medical examination at a health care facility  Patient will be scheduled for routine blood work and will be notified about test results.  Orders:    Lipid Panel; Future    CBC and Auto Differential; Future    Comprehensive metabolic panel; Future    Prostate Spec.Ag,Screen; Future    Urinalysis with Reflex Microscopic; Future    Encounter for immunization  He will be given influenza vaccine for health maintenance.  Orders:    Flu vaccine, trivalent, preservative free, age 6 months and greater (Fluarix/Fluzone/Flulaval)    Gastroesophageal reflux disease without esophagitis  He will continue Nexium regarding history of GERD.  Orders:    esomeprazole (NexIUM) 40 mg DR capsule; Take 1 capsule (40 mg) by mouth once daily in the morning. Take before meals. Do not fill till patient informs    Adenomatous polyp of colon, unspecified part of colon  He will continue to follow-up with gastroenterology clinic regarding history of colonic polyp.  Revealed       Alopecia  He will continue finasteride regarding history of alopecia.  Orders:    finasteride (Propecia) 1 mg tablet; Take 1 tablet (1 mg) by mouth once daily. Do not crush, chew, or split.

## 2024-10-17 NOTE — ASSESSMENT & PLAN NOTE
He will continue Nexium regarding history of GERD.  Orders:    esomeprazole (NexIUM) 40 mg DR capsule; Take 1 capsule (40 mg) by mouth once daily in the morning. Take before meals. Do not fill till patient informs

## 2024-10-21 ASSESSMENT — ENCOUNTER SYMPTOMS
GASTROINTESTINAL NEGATIVE: 1
CARDIOVASCULAR NEGATIVE: 1
CONSTITUTIONAL NEGATIVE: 1
HEMATOLOGIC/LYMPHATIC NEGATIVE: 1
NEUROLOGICAL NEGATIVE: 1
ALLERGIC/IMMUNOLOGIC NEGATIVE: 1
RESPIRATORY NEGATIVE: 1
ENDOCRINE NEGATIVE: 1
MUSCULOSKELETAL NEGATIVE: 1
EYES NEGATIVE: 1
PSYCHIATRIC NEGATIVE: 1

## 2024-10-31 DIAGNOSIS — E78.5 DYSLIPIDEMIA: Primary | ICD-10-CM

## 2025-08-19 ENCOUNTER — TELEPHONE (OUTPATIENT)
Dept: PRIMARY CARE | Facility: CLINIC | Age: 55
End: 2025-08-19
Payer: COMMERCIAL